# Patient Record
Sex: MALE | Race: WHITE | NOT HISPANIC OR LATINO | Employment: OTHER | URBAN - METROPOLITAN AREA
[De-identification: names, ages, dates, MRNs, and addresses within clinical notes are randomized per-mention and may not be internally consistent; named-entity substitution may affect disease eponyms.]

---

## 2017-01-24 ENCOUNTER — TELEPHONE (OUTPATIENT)
Dept: TRANSPLANT | Facility: CLINIC | Age: 47
End: 2017-01-24

## 2017-01-24 DIAGNOSIS — I50.9 CONGESTIVE HEART FAILURE, UNSPECIFIED CONGESTIVE HEART FAILURE CHRONICITY, UNSPECIFIED CONGESTIVE HEART FAILURE TYPE: Primary | ICD-10-CM

## 2017-01-24 NOTE — TELEPHONE ENCOUNTER
----- Message from Susan Griffith MA sent at 1/24/2017  1:53 PM CST -----  Contact: wife called      ----- Message -----     From: Marion Funes MA     Sent: 1/24/2017  12:42 PM       To: [facility] Heart Transplant Schedulers    Please call the patient wife at [phone] she need an apt with .Thank you.

## 2017-01-24 NOTE — TELEPHONE ENCOUNTER
Records received for OHT . OHT episode created once initial visit approved pt will be contacted and scheduled accordingly.

## 2017-01-24 NOTE — TELEPHONE ENCOUNTER
Spoke with patient wife, informed that the referral process was began today and should hear a call back on tomorrow no later than Thursday. Pt is open to see other physicians outside of Dr. Mckenzie as per  Requested.  Demographics updated.

## 2017-01-25 NOTE — TELEPHONE ENCOUNTER
"Patient approved for initial consult. Pt will be scheduled accordingly.     Labs & echo scheduled on the same day as consult.     Spoke with patient spouse while patient was on the phone as well regarding scheduled appointments. Given, appointments date, time,location and number to call if they need to cancel or rescheduled. All questions and concerns addressed. Understanding verbalized.     REFERRAL NOTE:    Urbano Hensley has been referred to the pre-heart transplant office for consideration for orthotopic heart transplantation by Mayito Dillard .  Patient's appointments have been scheduled for 2/3/2017.  Information was provided and questions were answered.  Copies of "What to Expect" and "Cardiomyopathy and Heart Transplant" were mailed to the patient.      "

## 2017-01-31 PROBLEM — R57.0 CARDIOGENIC SHOCK: Status: ACTIVE | Noted: 2017-01-31

## 2017-02-02 ENCOUNTER — HOSPITAL ENCOUNTER (INPATIENT)
Facility: HOSPITAL | Age: 47
LOS: 8 days | Discharge: HOME HEALTH | DRG: 270 | End: 2017-02-10
Attending: INTERNAL MEDICINE | Admitting: INTERNAL MEDICINE
Payer: MEDICARE

## 2017-02-02 DIAGNOSIS — I25.10 CORONARY ARTERY DISEASE INVOLVING NATIVE CORONARY ARTERY OF NATIVE HEART WITHOUT ANGINA PECTORIS: ICD-10-CM

## 2017-02-02 DIAGNOSIS — I42.0 COCM (CONGESTIVE CARDIOMYOPATHY): ICD-10-CM

## 2017-02-02 DIAGNOSIS — R57.0 CARDIOGENIC SHOCK: ICD-10-CM

## 2017-02-02 DIAGNOSIS — F20.0 PARANOID SCHIZOPHRENIA: ICD-10-CM

## 2017-02-02 DIAGNOSIS — I50.23 ACUTE ON CHRONIC SYSTOLIC CHF (CONGESTIVE HEART FAILURE), NYHA CLASS 4: ICD-10-CM

## 2017-02-02 DIAGNOSIS — I25.5 ISCHEMIC CARDIOMYOPATHY: ICD-10-CM

## 2017-02-02 LAB
ALBUMIN SERPL BCP-MCNC: 3.2 G/DL
ALP SERPL-CCNC: 181 U/L
ALT SERPL W/O P-5'-P-CCNC: 1061 U/L
ANION GAP SERPL CALC-SCNC: 11 MMOL/L
AST SERPL-CCNC: 929 U/L
BILIRUB SERPL-MCNC: 2.4 MG/DL
BNP SERPL-MCNC: 2917 PG/ML
BUN SERPL-MCNC: 54 MG/DL
CALCIUM SERPL-MCNC: 8.8 MG/DL
CHLORIDE SERPL-SCNC: 93 MMOL/L
CO2 SERPL-SCNC: 21 MMOL/L
CREAT SERPL-MCNC: 1.4 MG/DL
EST. GFR  (AFRICAN AMERICAN): >60 ML/MIN/1.73 M^2
EST. GFR  (NON AFRICAN AMERICAN): 59.4 ML/MIN/1.73 M^2
GLUCOSE SERPL-MCNC: 96 MG/DL
INR PPP: 2.3
MAGNESIUM SERPL-MCNC: 2.2 MG/DL
PHOSPHATE SERPL-MCNC: 3.8 MG/DL
POTASSIUM SERPL-SCNC: 5.9 MMOL/L
PROT SERPL-MCNC: 6.1 G/DL
PROTHROMBIN TIME: 22.6 SEC
SODIUM SERPL-SCNC: 125 MMOL/L

## 2017-02-02 PROCEDURE — 63600175 PHARM REV CODE 636 W HCPCS: Performed by: INTERNAL MEDICINE

## 2017-02-02 PROCEDURE — 80053 COMPREHEN METABOLIC PANEL: CPT

## 2017-02-02 PROCEDURE — 93010 ELECTROCARDIOGRAM REPORT: CPT | Mod: ,,, | Performed by: INTERNAL MEDICINE

## 2017-02-02 PROCEDURE — 51702 INSERT TEMP BLADDER CATH: CPT

## 2017-02-02 PROCEDURE — 84100 ASSAY OF PHOSPHORUS: CPT

## 2017-02-02 PROCEDURE — 5A02210 ASSISTANCE WITH CARDIAC OUTPUT USING BALLOON PUMP, CONTINUOUS: ICD-10-PCS | Performed by: INTERNAL MEDICINE

## 2017-02-02 PROCEDURE — 83880 ASSAY OF NATRIURETIC PEPTIDE: CPT

## 2017-02-02 PROCEDURE — C1751 CATH, INF, PER/CENT/MIDLINE: HCPCS

## 2017-02-02 PROCEDURE — 27100094 HC IABP, SET-UP

## 2017-02-02 PROCEDURE — 20000000 HC ICU ROOM

## 2017-02-02 PROCEDURE — 83735 ASSAY OF MAGNESIUM: CPT

## 2017-02-02 PROCEDURE — 85610 PROTHROMBIN TIME: CPT

## 2017-02-02 PROCEDURE — 33967 INSERT I-AORT PERCUT DEVICE: CPT

## 2017-02-02 PROCEDURE — 27200188 HC TRANSDUCER, ART ADULT/PEDS

## 2017-02-02 PROCEDURE — 93005 ELECTROCARDIOGRAM TRACING: CPT

## 2017-02-02 PROCEDURE — 36556 INSERT NON-TUNNEL CV CATH: CPT

## 2017-02-02 RX ORDER — DOBUTAMINE HYDROCHLORIDE 400 MG/100ML
5 INJECTION, SOLUTION INTRAVENOUS CONTINUOUS
Status: DISCONTINUED | OUTPATIENT
Start: 2017-02-02 | End: 2017-02-10 | Stop reason: HOSPADM

## 2017-02-02 RX ORDER — FUROSEMIDE 10 MG/ML
80 INJECTION INTRAMUSCULAR; INTRAVENOUS ONCE
Status: COMPLETED | OUTPATIENT
Start: 2017-02-02 | End: 2017-02-02

## 2017-02-02 RX ORDER — LIDOCAINE HYDROCHLORIDE 20 MG/ML
INJECTION, SOLUTION INFILTRATION; PERINEURAL
Status: DISPENSED
Start: 2017-02-02 | End: 2017-02-03

## 2017-02-02 RX ADMIN — FUROSEMIDE 80 MG: 10 INJECTION, SOLUTION INTRAMUSCULAR; INTRAVENOUS at 10:02

## 2017-02-02 RX ADMIN — FUROSEMIDE 15 MG/HR: 10 INJECTION, SOLUTION INTRAMUSCULAR; INTRAVENOUS at 11:02

## 2017-02-02 NOTE — IP AVS SNAPSHOT
[facility]  [address]  [facility] [address]  Phone: [phone]           Patient Discharge Instructions     Our goal is to set you up for success. This packet includes information on your condition, medications, and your home care. It will help you to care for yourself so you don't get sicker and need to go back to the hospital.     Please ask your nurse if you have any questions.        There are many details to remember when preparing to leave the hospital. Here is what you will need to do:    1. Take your medicine. If you are prescribed medications, review your Medication List in the following pages. You may have new medications to  at the pharmacy and others that you'll need to stop taking. Review the instructions for how and when to take your medications. Talk with your doctor or nurses if you are unsure of what to do.     2. Go to your follow-up appointments. Specific follow-up information is listed in the following pages. Your may be contacted by a transition nurse or clinical provider about future appointments. Be sure we have all of the phone numbers to reach you, if needed. Please contact your provider's office if you are unable to make an appointment.     3. Watch for warning signs. Your doctor or nurse will give you detailed warning signs to watch for and when to call for assistance. These instructions may also include educational information about your condition. If you experience any of warning signs to your health, call your doctor.               [facility]  Unless otherwise directed by your provider, please contact [facility], our nurse care line that is available for 24/7 assistance.     [phone] (toll-free)    Registered nurses in the [facility] provide clinical advisement, health education, appointment booking, and other advisory services.                    ** Verify the list of medication(s) below is accurate and up  to date. Carry this with you in case of emergency. If your medications have changed, please notify your healthcare provider.             Medication List      START taking these medications        Additional Info                      aspirin 81 MG EC tablet   Commonly known as:  ECOTRIN   Refills:  0   Dose:  81 mg    Last time this was given:  81 mg on 2/10/2017  8:45 AM   Instructions:  Take 1 tablet (81 mg total) by mouth once daily.     Begin Date    AM    Noon    PM    Bedtime       cyclobenzaprine 5 MG tablet   Commonly known as:  FLEXERIL   Quantity:  30 tablet   Refills:  3   Dose:  5 mg    Last time this was given:  5 mg on 2/9/2017  2:20 PM   Instructions:  Take 1 tablet (5 mg total) by mouth 3 (three) times daily as needed for Muscle spasms.     Begin Date    AM    Noon    PM    Bedtime       DOBUTamine 500 mg/250 mL (2,000 mcg/mL)   Commonly known as:  DOBUTREX   Quantity:  250 mL   Refills:  0   Dose:  5 mcg/kg/min    Last time this was given:  5 mcg/kg/min on 2/10/2017  4:00 AM   Instructions:  Inject 359.5 mcg/min into the vein continuous.     Begin Date    AM    Noon    PM    Bedtime       furosemide 80 MG tablet   Commonly known as:  LASIX   Quantity:  90 tablet   Refills:  11   Dose:  80 mg    Last time this was given:  80 mg on 2/10/2017  8:45 AM   Instructions:  Take 1 tablet (80 mg total) by mouth once daily.     Begin Date    AM    Noon    PM    Bedtime       lisinopril 2.5 MG tablet   Commonly known as:  PRINIVIL,ZESTRIL   Quantity:  180 tablet   Refills:  3   Dose:  2.5 mg    Last time this was given:  2.5 mg on 2/10/2017  8:45 AM   Instructions:  Take 1 tablet (2.5 mg total) by mouth 2 (two) times daily.     Begin Date    AM    Noon    PM    Bedtime       oxycodone 5 MG immediate release tablet   Commonly known as:  ROXICODONE   Quantity:  20 tablet   Refills:  0   Dose:  5 mg    Last time this was given:  5 mg on 2/10/2017  1:01 PM   Instructions:  Take 1 tablet (5 mg total) by mouth every 6  (six) hours as needed.     Begin Date    AM    Noon    PM    Bedtime       spironolactone 25 MG tablet   Commonly known as:  ALDACTONE   Quantity:  90 tablet   Refills:  11   Dose:  25 mg    Last time this was given:  25 mg on 2/10/2017  8:45 AM   Instructions:  Take 1 tablet (25 mg total) by mouth once daily.     Begin Date    AM    Noon    PM    Bedtime         CHANGE how you take these medications        Additional Info                      mirtazapine 45 MG tablet   Commonly known as:  REMERON   Refills:  0   Dose:  45 mg   What changed:  Another medication with the same name was removed. Continue taking this medication, and follow the directions you see here.    Last time this was given:  45 mg on 2/6/2017  9:04 PM   Instructions:  Take 45 mg by mouth every evening.     Begin Date    AM    Noon    PM    Bedtime         CONTINUE taking these medications        Additional Info                      clonazePAM 0.5 MG tablet   Commonly known as:  KLONOPIN   Refills:  0   Dose:  0.5 mg    Last time this was given:  0.5 mg on 2/9/2017  9:58 PM   Instructions:  Take 0.5 mg by mouth 2 (two) times daily.     Begin Date    AM    Noon    PM    Bedtime       clopidogrel 75 mg tablet   Commonly known as:  PLAVIX   Refills:  0   Dose:  75 mg    Last time this was given:  75 mg on 2/10/2017  8:46 AM   Instructions:  Take 75 mg by mouth once daily.     Begin Date    AM    Noon    PM    Bedtime       gabapentin 800 MG tablet   Commonly known as:  NEURONTIN   Refills:  0   Dose:  800 mg    Instructions:  Take 800 mg by mouth 2 (two) times daily.     Begin Date    AM    Noon    PM    Bedtime       hydrocodone-acetaminophen 5-325mg 5-325 mg per tablet   Commonly known as:  NORCO   Quantity:  24 tablet   Refills:  0   Dose:  2 tablet    Instructions:  Take 2 tablets by mouth every 6 (six) hours as needed for Pain (breakthrough pain not controlled by other analgesics.  May cause sedation.). Take 1-2 tablets     Begin Date    AM     Noon    PM    Bedtime       pramoxine-hydrocortisone 1-1 % rectal cream   Commonly known as:  PROCTOCREAM-HC   Quantity:  1 Tube   Refills:  0    Instructions:  Place rectally 3 (three) times daily.     Begin Date    AM    Noon    PM    Bedtime         STOP taking these medications     ibuprofen 600 MG tablet   Commonly known as:  ADVILMOTRIN            Where to Get Your Medications      These medications were sent to [id number] [place] - [address] [facility] [address] [facility] [address] [place] [address]    Hours:  24-hours Phone:  [phone]     cyclobenzaprine 5 MG tablet    furosemide 80 MG tablet    lisinopril 2.5 MG tablet    spironolactone 25 MG tablet         You can get these medications from any pharmacy     Bring a paper prescription for each of these medications     oxycodone 5 MG immediate release tablet       You don't need a prescription for these medications     aspirin 81 MG EC tablet         Information about where to get these medications is not yet available     ! Ask your nurse or doctor about these medications     DOBUTamine 500 mg/250 mL (2,000 mcg/mL)                  Please bring to all follow up appointments:    1. A copy of your discharge instructions.  2. All medicines you are currently taking in their original bottles.  3. Identification and insurance card.    Please arrive 15 minutes ahead of scheduled appointment time.    Please call 24 hours in advance if you must reschedule your appointment and/or time.        Follow-up Information     Follow up with Lowell Hurst MD In 2 weeks.          Primary Diagnosis     Your primary diagnosis was:  Heart Failure That Is Caused By Inadequate Blood Supply      Admission Information     Date & Time Provider Department [facility]    2/2/2017  9:45 PM Ghulam Keita MD [facility] [id number]      Care Providers     Provider Role Specialty Primary office phone    Ghulam Keita MD Attending  "Provider Cardiology [phone]    Ghulam Keita MD Consulting Physician  Cardiology [phone]    Jaci Nieto MD Team Attending  Cardiology [phone]      Your Vitals Were     BP Pulse Temp Resp Height Weight    95/56 (BP Location: Right arm, Patient Position: Sitting, BP Method: Automatic) 88 98.3 °F (36.8 °C) (Oral) 18 5' 11" (1.803 m) 65.9 kg (145 lb 4.5 oz)    SpO2 BMI             99% 20.26 kg/m2         Recent Lab Values     No lab values to display.      Allergies as of 2/10/2017     No Known Allergies      Advance Directives     An advance directive is a document which, in the event you are no longer able to make decisions for yourself, tells your healthcare team what kind of treatment you do or do not want to receive, or who you would like to make those decisions for you.  If you do not currently have an advance directive, [facility] encourages you to create one.  For more information call:  [phone]), [phone] ([phone]),  or log on to [email or web address].        Smoking Cessation     If you would like to quit smoking:   You may be eligible for free services if you are a [place] resident and started smoking cigarettes before September 1, 1988.  Call the Smoking Cessation Trust (SCT) toll free at (337) 926-3205 or (120) 627-5202.   Call 1-800-QUIT-NOW if you do not meet the above criteria.            Language Assistance Services     ATTENTION: Language assistance services are available, free of charge. Please call [phone].      ATENCIÓN: Si habla español, tiene a zabala disposición servicios gratuitos de asistencia lingüística. Llame al [phone].     CHÚ Ý: N?u b?n nói Ti?ng Vi?t, có các d?ch v? h? tr? ngôn ng? mi?n phí dành cho b?n. G?i s? [phone].        Heart Failure Education       Heart Failure: Being Active  You have a condition called heart failure. Being active doesnt mean that you have to wear yourself out. Even a little movement each day " helps to strengthen your heart. If you cant get out to exercise, you can do simple stretching and strengthening exercises at home. These are good ways to keep you well-conditioned and prevent you and your heart from becoming excessively weak.    Ideas to get you started  · Add a little movement to things you do now. Walk to mail letters. Park your car at the far end of the parking lot and walk to the store. Walk up a flight of stairs instead of taking the elevator.  · Choose activities you enjoy. You might walk, swim, or ride an exercise bike. Things like gardening and washing the car count, too. Other possibilities include: washing dishes, walking the dog, walking around the mall, and doing aerobic activities with friends.  · Join a group exercise program at a [facility] or [facility], a senior center, or a community center. Or look into a hospital cardiac rehabilitation program. Ask your doctor if you qualify.  Tips to keep you going  · Get up and get dressed each day. Go to a coffee shop and read a newspaper or go somewhere that you'll be in the presence of other active people. Youll feel more like being active.  · Make a plan. Choose one or more activities that you enjoy and that you can easily do. Then plan to do at least one each day. You might write your plan on a calendar.  · Go with a friend or a group if you like company. This can help you feel supported and stay motivated, too.  · Plan social events that you enjoy. This will keep you mentally engaged as well as physically motivated to do things you find pleasure in.  For your safety  · Talk with your healthcare provider before starting an exercise program.  · Exercise indoors when its too hot or too cold outside, or when the air quality is poor. Try walking at a shopping mall.  · Wear socks and sturdy shoes to maintain your balance and prevent falls.  · Start slowly. Do a few minutes several times a day at first. Increase your time and speed little by  little.  · Stop and rest whenever you feel tired or get short of breath.  · Dont push yourself on days when you dont feel well.  Date Last Reviewed: 3/20/2016  © 6849-6360 [employer]. [address] [place] [address], [place] [address]. All rights reserved. This information is not intended as a substitute for professional medical care. Always follow your healthcare professional's instructions.              Heart Failure: Evaluating Your Heart  You have a condition called heart failure. To evaluate your condition, your doctor will examine you, ask questions, and do some tests. Along with looking for signs of heart failure, the doctor looks for any other health problems that may have led to heart failure. The results of your evaluation will help your doctor form a treatment plan.  Health history and physical exam  Your visit will start with a health history. Tell the doctor about any symptoms youve noticed and about all medicines you take. Then youll have a physical exam. This includes listening to your heartbeat and breathing. Youll also be checked for swelling (edema) in your legs and neck. When you have fluid buildup or fluid in the lungs, it may be called congestive heart failure.  Diagnosing heart failure     During an echocardiogram, sound waves bounce off the heart. These are converted into a picture on the screen.   The following may be done to help your doctor form a diagnosis:  · X-rays show the size and shape of your heart. These pictures can also show fluid in your lungs.  · An electrocardiogram (ECG or EKG) shows the pattern of your heartbeat. Small pads (electrodes) are placed on your chest, arms, and legs. Wires connect the pads to the ECG machine, which records your hearts electrical signals. This can give the doctor information about heart function.  · An echocardiogram uses ultrasound waves to show the structure and movement of your heart muscle. This shows how well the heart pumps. It  also shows the thickness of the heart walls, and if the heart is enlarged. It is one of the most useful, non-invasive tests as it provides information about the heart's general function. This helps your doctor make treatment decisions.  · Lab tests evaluate small amounts of blood or urine for signs of problems. A BNP lab test can help diagnose and evaluate heart failure. BNP stands for B-type natriuretic peptide. The ventricles secrete more BNP when heart failure worsens. Lab tests can also provide information about metabolic dysfunction or heart dysfunction.  Your treatment plan  Based on the results of your evaluation and tests, your doctor will develop a treatment plan. This plan is designed to relieve some of your heart failure symptoms and help make you more comfortable. Your treatment plan may include:  · Medicine to help your heart work better and improve your quality of life  · Changes in what you eat and drink to help prevent fluid from backing up in your body  · Daily monitoring of your weight and heart failure symptoms to see how well your treatment plan is working  · Exercise to help you stay healthy  · Help with quitting smoking  · Emotional and psychological support to help adjust to the changes  · Referrals to other specialists to make sure you are being treated comprehensively  Date Last Reviewed: 3/21/2016  © 4166-5639 [employer]. [address] [place] [address], [place] [address]. All rights reserved. This information is not intended as a substitute for professional medical care. Always follow your healthcare professional's instructions.              Heart Failure: Making Changes to Your Diet  You have a condition called heart failure. When you have heart failure, excess fluid is more likely to build up in your body because your heart isn't working well. This makes the heart work harder to pump blood. Fluid buildup causes symptoms such as shortness of breath and swelling (edema). This is  often referred to as congestive heart failure or CHF. Controlling the amount of salt (sodium) you eat may help stop fluid from building up. Your doctor may also tell you to reduce the amount of fluid you drink.  Reading food labels    Your healthcare provider will tell you how much sodium you can eat each day. Read food labels to keep track. Keep in mind that certain foods are high in salt. These include canned, frozen, and processed foods. Check the amount of sodium in each serving. Watch out for high-sodium ingredients. These include MSG (monosodium glutamate), baking soda, and sodium phosphate.   Eating less salt  Give yourself time to get used to eating less salt. It may take a little while. Here are some tips to help:  · Take the saltshaker off the table. Replace it with salt-free herb mixes and spices.  · Eat fresh or plain frozen vegetables. These have much less salt than canned vegetables.  · Choose low-sodium snacks like sodium-free pretzels, crackers, or air-popped popcorn.  · Dont add salt to your food when youre cooking. Instead, season your foods with pepper, lemon, garlic, or onion.  · When you eat out, ask that your food be cooked without added salt.  · Avoid eating fried foods as these often have a great deal of salt.  If youre told to limit fluids  You may need to limit how much fluid you have to help prevent swelling. This includes anything that is liquid at room temperature, such as ice cream and soup. If your doctor tells you to limit fluid, try these tips:  · Measure drinks in a measuring cup before you drink them. This will help you meet daily goals.  · Chill drinks to make them more refreshing.  · Suck on frozen lemon wedges to quench thirst.  · Only drink when youre thirsty.  · Chew sugarless gum or suck on hard candy to keep your mouth moist.  · Weigh yourself daily to know if your body's fluid content is rising.  My sodium goal  Your healthcare provider may give you a sodium goal to  meet each day. This includes sodium found in food as well as salt that you add. My goal is to eat no more than ___________ mg of sodium per day.     When to call your doctor  Call your doctor right away if you have any symptoms of worsening heart failure. These can include:  · Sudden weight gain  · Increased swelling of your legs or ankles  · Trouble breathing when youre resting or at night  · Increase in the number of pillows you have to sleep on  · Chest pain, pressure, discomfort, or pain in the jaw, neck, or back   Date Last Reviewed: 3/21/2016  © 0813-5504 [employer]. [address] [place] [address], [place] [address]. All rights reserved. This information is not intended as a substitute for professional medical care. Always follow your healthcare professional's instructions.              Heart Failure: Medicines to Help Your Heart    You have a condition called heart failure (also known as congestive heart failure, or CHF). Your doctor will likely prescribe medicines for heart failure and any underlying health problems you have. Most heart failure patients take one or more types of medicinen. Your healthcare provider will work to find the combination of medicines that works best for you.  Heart failure medicines  Here are the most common heart failure medicines:  · ACE inhibitors lower blood pressure and decrease strain on the heart. This makes it easier for the heart to pump. Angiotensin receptor blockers have similar effects. These are prescribed for some patients instead of ACE inhibitors.  · Beta-blockers relieve stress on the heart. They also improve symptoms. They may also improve the heart's pumping action over time.  · Diuretics (also called water pills) help rid your body of excess water. This can help rid your body of swelling (edema). Having less fluid to pump means your heart doesnt have to work as hard. Some diuretics make your body lose a mineral called potassium. Your doctor will tell  you if you need to take supplements or eat more foods high in potassium.  · Digoxin helps your heart pump with more strength. This helps your heart pump more blood with each beat. So, more oxygen-rich blood travels to the rest of the body.  · Aldosterone antagonists help alter hormones and decrease strain on the heart.  · Hydralazine and nitrates are two separate medicines used together to treat heart failure. They may come in one combination pill. They lower blood pressure and decrease how hard the heart has to pump.  Medicines for related conditions  Controlling other heart problems helps keep heart failure under control, too. Depending on other heart problems you have, medicines may be prescribed to:  · Lower blood pressure (antihypertensives).  · Lower cholesterol levels (statins).  · Prevent blood clots (anticoagulants or aspirin).  · Keep the heartbeat steady (antiarrhythmics).  Date Last Reviewed: 3/5/2016  © 8311-9884 [employer]. [address] [place] [address]. All rights reserved. This information is not intended as a substitute for professional medical care. Always follow your healthcare professional's instructions.              Heart Failure: Procedures That May Help    The heart is a muscle that pumps oxygen-rich blood to all parts of the body. When you have heart failure, the heart is not able to pump as well as it should. Blood and fluid may back up into the lungs (congestive heart failure), and some parts of the body dont get enough oxygen-rich blood to work normally. These problems lead to the symptoms of heart failure.     Certain procedures may help the heart pump better in some cases of heart failure. Some procedures are done to treat health problems that may have caused the heart failure such as coronary artery disease or heart rhythm problems. For more serious heart failure, other options are available.  Treating artery and valve problems  If you have coronary artery  disease or valve disease, procedures may be done to improve blood flow. This helps the heart pump better, which can improve heart failure symptoms. First, your doctor may do a cardiac catheterization to help detect clogged blood vessels or valve damage. During this procedure, a  thin tube (catheter) in inserted into a blood vessel and guided to the heart. There a dye is injected and a special type of X-ray (angiogram) is taken of the blood vessels. Procedures to open a blocked artery or fix damaged valves can also be done using catheterization.  · Angioplasty uses a balloon-tipped instrument at the end of the catheter. The balloon is inflated to widen the narrowed artery. In many cases, a stent is expanded to further support the narrowed artery. A stent is a metal mesh tube.  · Valve surgery repairs or replacement of faulty valves can also be done during catheterization so blood can flow properly through the chambers of the heart.  Bypass surgery is another option to help treat blocked arteries. It uses a healthy blood vessel from elsewhere in the body. The healthy blood vessel is attached above and below the blocked area so that blood can flow around the blocked artery.  Treating heart rhythm problems  A device may be placed in the chest to help a weak heart maintain a healthy, heartbeat so the heart can pump more effectively:  · Pacemaker. A pacemaker is an implanted device that regulates your heartbeat electronically. It monitors your heart's rhythm and generates a painless electric impulse that helps the heart beat in a regular rhythm. A pacemaker is programmed to meet your specific heart rhythm needs.  · Biventricular pacing/cardiac resynchronization therapy. A type of pacemaker that paces both pumping chambers of the heart at the same time to coordinate contractions and to improve the heart's function. Some people with heart failure are candidates for this therapy.  · Implantable cardioverter defibrillator. A  device similar to a pacemaker that senses when the heart is beating too fast and delivers an electrical shock to convert the fast rhythm to a normal rhythm. This can be a life saving device.  In severe cases  In more serious cases of heart failure when other treatments no longer work, other options may include:  · Ventricular assist devices (VADs). These are mechanical devices used to take over the pumping function for one or both of the heart's ventricles, or pumping chambers. A VAD may be necessary when heart failure progresses to the point that medicines and other treatments no longer help. In some cases, a VAD may be used as a bridge to transplant.  · Heart transplant. This is replacing the diseased heart with a healthy one from a donor. This is an option for a few people who are very sick. A heart transplant is very serious and not an option for all patients. Your doctor can tell you more.  Date Last Reviewed: 3/20/2016  © 1442-7271 [employer]. [address] [place] [address]. All rights reserved. This information is not intended as a substitute for professional medical care. Always follow your healthcare professional's instructions.              Heart Failure: Tracking Your Weight  You have a condition called heart failure. When you have heart failure, a sudden weight gain or a steady rise in weight is a warning sign that your body is retaining too much water and salt. This could mean your heart failure is getting worse. If left untreated, it can cause problems for your lungs and result in shortness of breath. Weighing yourself each day is the best way to know if youre retaining water. If your weight goes up quickly, call your doctor. You will be given instructions on how to get rid of the excess water. You will likely need medicines and to avoid salt. This will help your heart work better.  Call your doctor if you gain more than 2 pounds in 1 day, more than 5 pounds in 1 week, or  whatever weight gain you were told to report by your doctor. This is often a sign of worsening heart failure and needs to be evaluated and treated. Your doctor will tell you what to do next.   Tips for weighing yourself    · Weigh yourself at the same time each morning, wearing the same clothes. Weigh yourself after urinating and before eating.  · Use the same scale each day. Make sure the numbers are easy to read. Put the scale on a flat, hard surface -- not on a rug or carpet.  · Do not stop weighing yourself. If you forget one day, weigh again the next morning.  How to use your weight chart  · Keep your weight chart near the scale. Write your weight on the chart as soon as you get off the scale.  · Fill in the month and the start date on the chart. Then write down your weight each day. Your chart will look like this:    · If you miss a day, leave the space blank. Weigh yourself the next day and write your weight in the next space.  · Take your weight chart with you when you go to see your doctor.  Date Last Reviewed: 3/20/2016  © 9171-1881 [employer]. [address] [place] [address], [place] [address]. All rights reserved. This information is not intended as a substitute for professional medical care. Always follow your healthcare professional's instructions.              Heart Failure: Warning Signs of a Flare-Up  You have a condition called heart failure. Once you have heart failure, flare-ups can happen. Below are signs that can mean your heart failure is getting worse. If you notice any of these warning signs, call your healthcare provider.  Swelling    · Your feet, ankles, or lower legs get puffier.  · You notice skin changes on your lower legs.  · Your shoes feel too tight.  · Your clothes are tighter in the waist.  · You have trouble getting rings on or off your fingers.  Shortness of breath  · You have to breathe harder even when youre doing your normal activities or when youre resting.  · You are  short of breath walking up stairs or even short distances.  · You wake up at night short of breath or coughing.  · You need to use more pillows or sit up to sleep.  · You wake up tired or restless.  Other warning signs  · You feel weaker, dizzy, or more tired.  · You have chest pain or changes in your heartbeat.  · You have a cough that wont go away.  · You cant remember things or dont feel like eating.  Tracking your weight  Gaining weight is often the first warning sign that heart failure is getting worse. Gaining even a few pounds can be a sign that your body is retaining excess water and salt. Weighing yourself each day in the morning after you urinate and before you eat, is the best way to know if you're retaining water. Get a scale that is easy to read and make sure you wear the same clothes and use the same scale every time you weigh. Your healthcare provider will show you how to track your weight. Call your doctor if you gain more than 2 pounds in 1 day, 5 pounds in 1 week, or whatever weight gain you were told to report by your doctor. This is often a sign of worsening heart failure and needs to be evaluated and treated before it compromises your breathing. Your doctor will tell you what to do next.    Date Last Reviewed: 3/15/2016  © 4940-8083 [employer]. [address] [place] [address]. All rights reserved. This information is not intended as a substitute for professional medical care. Always follow your healthcare professional's instructions.              [facility] Sign-Up     Activating your [facility] account is as easy as 1-2-3!     1) Visit [email or web address], select Sign Up Now, enter this activation code and your date of birth, then select Next.  [email or web address]  Expires: 3/27/2017 11:32 AM      2) Create a username and password to use when you visit [facility] in the future and select a security question in case you lose your password and select Next.    3) Enter your  e-mail address and click Sign Up!    Additional Information  If you have questions, please e-mail [email or web address] or call [phone] to talk to our [facility] staff. Remember, [facility] is NOT to be used for urgent needs. For medical emergencies, dial 911.          [facility] complies with applicable Federal civil rights laws and does not discriminate on the basis of race, color, national origin, age, disability, or sex.

## 2017-02-03 LAB
ALLENS TEST: ABNORMAL
ALLENS TEST: ABNORMAL
ANION GAP SERPL CALC-SCNC: 8 MMOL/L
ANION GAP SERPL CALC-SCNC: 8 MMOL/L
BASOPHILS # BLD AUTO: 0 K/UL
BASOPHILS NFR BLD: 0 %
BUN SERPL-MCNC: 42 MG/DL
BUN SERPL-MCNC: 50 MG/DL
CALCIUM SERPL-MCNC: 8 MG/DL
CALCIUM SERPL-MCNC: 8.6 MG/DL
CHLORIDE SERPL-SCNC: 91 MMOL/L
CHLORIDE SERPL-SCNC: 92 MMOL/L
CO2 SERPL-SCNC: 29 MMOL/L
CO2 SERPL-SCNC: 33 MMOL/L
CREAT SERPL-MCNC: 1.1 MG/DL
CREAT SERPL-MCNC: 1.2 MG/DL
DIFFERENTIAL METHOD: ABNORMAL
EOSINOPHIL # BLD AUTO: 0 K/UL
EOSINOPHIL NFR BLD: 0 %
ERYTHROCYTE [DISTWIDTH] IN BLOOD BY AUTOMATED COUNT: 15.1 %
EST. GFR  (AFRICAN AMERICAN): >60 ML/MIN/1.73 M^2
EST. GFR  (AFRICAN AMERICAN): >60 ML/MIN/1.73 M^2
EST. GFR  (NON AFRICAN AMERICAN): >60 ML/MIN/1.73 M^2
EST. GFR  (NON AFRICAN AMERICAN): >60 ML/MIN/1.73 M^2
GLUCOSE SERPL-MCNC: 110 MG/DL
GLUCOSE SERPL-MCNC: 94 MG/DL
HCO3 UR-SCNC: 25.7 MMOL/L (ref 24–28)
HCO3 UR-SCNC: 28.3 MMOL/L (ref 24–28)
HCT VFR BLD AUTO: 35.1 %
HGB BLD-MCNC: 11.7 G/DL
INR PPP: 2.1
LYMPHOCYTES # BLD AUTO: 0.9 K/UL
LYMPHOCYTES NFR BLD: 9.7 %
MAGNESIUM SERPL-MCNC: 1.8 MG/DL
MAGNESIUM SERPL-MCNC: 2.3 MG/DL
MCH RBC QN AUTO: 28 PG
MCHC RBC AUTO-ENTMCNC: 33.3 %
MCV RBC AUTO: 84 FL
MONOCYTES # BLD AUTO: 1 K/UL
MONOCYTES NFR BLD: 11.1 %
NEUTROPHILS # BLD AUTO: 7.4 K/UL
NEUTROPHILS NFR BLD: 78.9 %
PCO2 BLDA: 41.4 MMHG (ref 35–45)
PCO2 BLDA: 42.7 MMHG (ref 35–45)
PH SMN: 7.4 [PH] (ref 7.35–7.45)
PH SMN: 7.43 [PH] (ref 7.35–7.45)
PLATELET # BLD AUTO: 189 K/UL
PMV BLD AUTO: 11.7 FL
PO2 BLDA: 30 MMHG (ref 40–60)
PO2 BLDA: 35 MMHG (ref 40–60)
POC BE: 1 MMOL/L
POC BE: 4 MMOL/L
POC PCO2 TEMP: 41.4 MMHG
POC PH TEMP: 7.4
POC PO2 TEMP: 30 MMHG
POC SATURATED O2: 57 % (ref 95–100)
POC SATURATED O2: 69 % (ref 95–100)
POC TCO2: 27 MMOL/L (ref 24–29)
POC TCO2: 30 MMOL/L (ref 24–29)
POC TEMPERATURE: ABNORMAL
POTASSIUM SERPL-SCNC: 3.2 MMOL/L
POTASSIUM SERPL-SCNC: 3.9 MMOL/L
PROTHROMBIN TIME: 21.2 SEC
RBC # BLD AUTO: 4.18 M/UL
SAMPLE: ABNORMAL
SAMPLE: ABNORMAL
SITE: ABNORMAL
SITE: ABNORMAL
SODIUM SERPL-SCNC: 128 MMOL/L
SODIUM SERPL-SCNC: 133 MMOL/L
WBC # BLD AUTO: 9.39 K/UL

## 2017-02-03 PROCEDURE — 63600175 PHARM REV CODE 636 W HCPCS: Performed by: NURSE PRACTITIONER

## 2017-02-03 PROCEDURE — 83735 ASSAY OF MAGNESIUM: CPT

## 2017-02-03 PROCEDURE — 63600175 PHARM REV CODE 636 W HCPCS: Performed by: INTERNAL MEDICINE

## 2017-02-03 PROCEDURE — 27200234 HC IABP BALLOON

## 2017-02-03 PROCEDURE — 25500020 PHARM REV CODE 255: Performed by: STUDENT IN AN ORGANIZED HEALTH CARE EDUCATION/TRAINING PROGRAM

## 2017-02-03 PROCEDURE — 94761 N-INVAS EAR/PLS OXIMETRY MLT: CPT

## 2017-02-03 PROCEDURE — 20000000 HC ICU ROOM

## 2017-02-03 PROCEDURE — 82803 BLOOD GASES ANY COMBINATION: CPT

## 2017-02-03 PROCEDURE — 85610 PROTHROMBIN TIME: CPT

## 2017-02-03 PROCEDURE — 27000221 HC OXYGEN, UP TO 24 HOURS

## 2017-02-03 PROCEDURE — 99223 1ST HOSP IP/OBS HIGH 75: CPT | Mod: ,,, | Performed by: INTERNAL MEDICINE

## 2017-02-03 PROCEDURE — 83735 ASSAY OF MAGNESIUM: CPT | Mod: 91

## 2017-02-03 PROCEDURE — 27000202 HC IABP, ADD'L DAY

## 2017-02-03 PROCEDURE — 25000003 PHARM REV CODE 250: Performed by: INTERNAL MEDICINE

## 2017-02-03 PROCEDURE — 80048 BASIC METABOLIC PNL TOTAL CA: CPT

## 2017-02-03 PROCEDURE — 25000003 PHARM REV CODE 250: Performed by: NURSE PRACTITIONER

## 2017-02-03 PROCEDURE — 99900035 HC TECH TIME PER 15 MIN (STAT)

## 2017-02-03 PROCEDURE — 85025 COMPLETE CBC W/AUTO DIFF WBC: CPT

## 2017-02-03 PROCEDURE — 02HV33Z INSERTION OF INFUSION DEVICE INTO SUPERIOR VENA CAVA, PERCUTANEOUS APPROACH: ICD-10-PCS | Performed by: INTERNAL MEDICINE

## 2017-02-03 PROCEDURE — 80048 BASIC METABOLIC PNL TOTAL CA: CPT | Mod: 91

## 2017-02-03 RX ORDER — LISINOPRIL 2.5 MG/1
2.5 TABLET ORAL 2 TIMES DAILY
Status: DISCONTINUED | OUTPATIENT
Start: 2017-02-03 | End: 2017-02-10 | Stop reason: HOSPADM

## 2017-02-03 RX ORDER — POTASSIUM CHLORIDE 20 MEQ/1
40 TABLET, EXTENDED RELEASE ORAL ONCE
Status: COMPLETED | OUTPATIENT
Start: 2017-02-03 | End: 2017-02-03

## 2017-02-03 RX ORDER — LISINOPRIL 2.5 MG/1
2.5 TABLET ORAL 2 TIMES DAILY
Status: DISCONTINUED | OUTPATIENT
Start: 2017-02-03 | End: 2017-02-03

## 2017-02-03 RX ORDER — ASPIRIN 81 MG/1
81 TABLET ORAL DAILY
Status: DISCONTINUED | OUTPATIENT
Start: 2017-02-03 | End: 2017-02-10 | Stop reason: HOSPADM

## 2017-02-03 RX ORDER — MIRTAZAPINE 15 MG/1
45 TABLET, FILM COATED ORAL NIGHTLY
Status: DISCONTINUED | OUTPATIENT
Start: 2017-02-03 | End: 2017-02-10 | Stop reason: HOSPADM

## 2017-02-03 RX ORDER — POTASSIUM CHLORIDE 29.8 MG/ML
40 INJECTION INTRAVENOUS ONCE
Status: COMPLETED | OUTPATIENT
Start: 2017-02-03 | End: 2017-02-03

## 2017-02-03 RX ORDER — OXYCODONE HYDROCHLORIDE 5 MG/1
5 TABLET ORAL EVERY 4 HOURS PRN
Status: DISCONTINUED | OUTPATIENT
Start: 2017-02-03 | End: 2017-02-10 | Stop reason: HOSPADM

## 2017-02-03 RX ORDER — MORPHINE SULFATE 2 MG/ML
2 INJECTION, SOLUTION INTRAMUSCULAR; INTRAVENOUS EVERY 4 HOURS PRN
Status: DISCONTINUED | OUTPATIENT
Start: 2017-02-03 | End: 2017-02-10 | Stop reason: HOSPADM

## 2017-02-03 RX ORDER — HYDROMORPHONE HYDROCHLORIDE 1 MG/ML
0.5 INJECTION, SOLUTION INTRAMUSCULAR; INTRAVENOUS; SUBCUTANEOUS ONCE
Status: COMPLETED | OUTPATIENT
Start: 2017-02-03 | End: 2017-02-03

## 2017-02-03 RX ORDER — GABAPENTIN 400 MG/1
800 CAPSULE ORAL 2 TIMES DAILY
Status: DISCONTINUED | OUTPATIENT
Start: 2017-02-03 | End: 2017-02-10 | Stop reason: HOSPADM

## 2017-02-03 RX ORDER — SPIRONOLACTONE 25 MG/1
25 TABLET ORAL DAILY
Status: DISCONTINUED | OUTPATIENT
Start: 2017-02-03 | End: 2017-02-10 | Stop reason: HOSPADM

## 2017-02-03 RX ORDER — POTASSIUM CHLORIDE 20 MEQ/1
20 TABLET, EXTENDED RELEASE ORAL 2 TIMES DAILY
Status: DISCONTINUED | OUTPATIENT
Start: 2017-02-03 | End: 2017-02-04

## 2017-02-03 RX ORDER — CYCLOBENZAPRINE HCL 5 MG
5 TABLET ORAL 3 TIMES DAILY PRN
Status: DISCONTINUED | OUTPATIENT
Start: 2017-02-03 | End: 2017-02-10 | Stop reason: HOSPADM

## 2017-02-03 RX ORDER — CLOPIDOGREL BISULFATE 75 MG/1
75 TABLET ORAL DAILY
Status: DISCONTINUED | OUTPATIENT
Start: 2017-02-03 | End: 2017-02-10 | Stop reason: HOSPADM

## 2017-02-03 RX ORDER — CLONAZEPAM 0.5 MG/1
0.5 TABLET ORAL 2 TIMES DAILY
Status: DISCONTINUED | OUTPATIENT
Start: 2017-02-03 | End: 2017-02-10 | Stop reason: HOSPADM

## 2017-02-03 RX ORDER — MAGNESIUM SULFATE HEPTAHYDRATE 40 MG/ML
2 INJECTION, SOLUTION INTRAVENOUS ONCE
Status: COMPLETED | OUTPATIENT
Start: 2017-02-03 | End: 2017-02-03

## 2017-02-03 RX ADMIN — GABAPENTIN 800 MG: 400 CAPSULE ORAL at 09:02

## 2017-02-03 RX ADMIN — MORPHINE SULFATE 2 MG: 2 INJECTION, SOLUTION INTRAMUSCULAR; INTRAVENOUS at 07:02

## 2017-02-03 RX ADMIN — CLOPIDOGREL 75 MG: 75 TABLET, FILM COATED ORAL at 09:02

## 2017-02-03 RX ADMIN — POTASSIUM CHLORIDE 40 MEQ: 400 INJECTION, SOLUTION INTRAVENOUS at 03:02

## 2017-02-03 RX ADMIN — CLONAZEPAM 0.5 MG: 0.5 TABLET ORAL at 09:02

## 2017-02-03 RX ADMIN — OXYCODONE HYDROCHLORIDE 5 MG: 5 TABLET ORAL at 09:02

## 2017-02-03 RX ADMIN — LISINOPRIL 2.5 MG: 2.5 TABLET ORAL at 09:02

## 2017-02-03 RX ADMIN — OXYCODONE HYDROCHLORIDE 5 MG: 5 TABLET ORAL at 01:02

## 2017-02-03 RX ADMIN — DOBUTAMINE IN DEXTROSE 5 MCG/KG/MIN: 200 INJECTION, SOLUTION INTRAVENOUS at 06:02

## 2017-02-03 RX ADMIN — POTASSIUM CHLORIDE 20 MEQ: 1500 TABLET, EXTENDED RELEASE ORAL at 09:02

## 2017-02-03 RX ADMIN — POTASSIUM CHLORIDE 40 MEQ: 1500 TABLET, EXTENDED RELEASE ORAL at 04:02

## 2017-02-03 RX ADMIN — ASPIRIN 81 MG: 81 TABLET, COATED ORAL at 09:02

## 2017-02-03 RX ADMIN — SPIRONOLACTONE 25 MG: 25 TABLET, FILM COATED ORAL at 11:02

## 2017-02-03 RX ADMIN — CYCLOBENZAPRINE HYDROCHLORIDE 5 MG: 5 TABLET, FILM COATED ORAL at 09:02

## 2017-02-03 RX ADMIN — HYDROMORPHONE HYDROCHLORIDE 0.5 MG: 1 INJECTION, SOLUTION INTRAMUSCULAR; INTRAVENOUS; SUBCUTANEOUS at 04:02

## 2017-02-03 RX ADMIN — CYCLOBENZAPRINE HYDROCHLORIDE 5 MG: 5 TABLET, FILM COATED ORAL at 07:02

## 2017-02-03 RX ADMIN — SODIUM POLYSTYRENE SULFONATE 30 G: 15 SUSPENSION ORAL; RECTAL at 01:02

## 2017-02-03 RX ADMIN — FUROSEMIDE 15 MG/HR: 10 INJECTION, SOLUTION INTRAMUSCULAR; INTRAVENOUS at 02:02

## 2017-02-03 RX ADMIN — MORPHINE SULFATE 2 MG: 2 INJECTION, SOLUTION INTRAMUSCULAR; INTRAVENOUS at 11:02

## 2017-02-03 RX ADMIN — MAGNESIUM SULFATE IN WATER 2 G: 40 INJECTION, SOLUTION INTRAVENOUS at 03:02

## 2017-02-03 RX ADMIN — MIRTAZAPINE 45 MG: 15 TABLET, FILM COATED ORAL at 03:02

## 2017-02-03 RX ADMIN — MIRTAZAPINE 45 MG: 15 TABLET, FILM COATED ORAL at 09:02

## 2017-02-03 RX ADMIN — IOHEXOL 15 ML: 350 INJECTION, SOLUTION INTRAVENOUS at 05:02

## 2017-02-03 NOTE — PROCEDURES
Interventional Cardiology Procedure Note  Placed 40 cc IABP through a 7.5 Fr sheath in the RCFA at the bedside using sterile technique. Sutured in place and position confirmed with CXR. No immediate complications.     Please call Interventional if there is any change in vascular exam     Continue supportive care per HTS     Gerard Radford MD, MPH  PGY-IV  Cardiovascular Disease Fellow

## 2017-02-03 NOTE — H&P
Cardiology History & Physical  Attending Physician: Hasmukh Sewell Jr.,*  Chief Complaint: Cardiogenic shock     HPI:   46 y.o. gentleman with PMH of ICM (EF=15%), CAD (s/p PCI), HLD, HTN, depression and schizophrenia that presents from OSH for management of cardiogenic shock and work-up of advanced options.    Patient initially admitted to OSH on 1/29 with increasing abdominal swelling, SOB and nausea over a 3 day period. He also reports increased LE edema. Patient was supposed to see Dr. Keita on 2/3 in clinic for advanced options work-up. However, at OSH, he was found to be in cardiogenic shock with worsening ascites, renal dysfunction, elevated transaminases and worsening serum Na / K levels despite initiation of  and diuresis with Lasix. Plan was for patient to have IABP placed but was deferred due to elevated INR.    Patient denies chest pain, orthopnea, PND, palpitations, lightheadedness, dizziness, syncope, LE edema.    ROS:    Constitution: Negative for fever, chills, weight loss or gain.   HENT: Negative for sore throat, rhinorrhea, or headache.  Eyes: Negative for blurred or double vision.   Cardiovascular: See above  Pulmonary: Positive for SOB   Gastrointestinal: Negative for abdominal pain, nausea, vomiting, or diarrhea.   : Negative for dysuria.   Neurological: Negative for focal weakness or sensory changes.  PMH:     Past Medical History   Diagnosis Date    Bipolar 1 disorder     Coronary artery disease     Paranoid schizophrenia      Past Surgical History   Procedure Laterality Date    Cardiac surgery      Shoulder surgery       Allergies:   Review of patient's allergies indicates:  No Known Allergies  Medications:     No current facility-administered medications on file prior to encounter.      Current Outpatient Prescriptions on File Prior to Encounter   Medication Sig Dispense Refill    hydrocodone-acetaminophen 5-325mg (NORCO) 5-325 mg per tablet Take 2 tablets by mouth  every 6 (six) hours as needed for Pain (breakthrough pain not controlled by other analgesics.  May cause sedation.). Take 1-2 tablets 24 tablet 0    ibuprofen (ADVIL,MOTRIN) 600 MG tablet Take 1 tablet (600 mg total) by mouth every 6 (six) hours as needed for Pain (Take with food). 20 tablet 0    pramoxine-hydrocortisone (PROCTOCREAM-HC) 1-1 % rectal cream Place rectally 3 (three) times daily. 1 Tube 0       Inpatient Medications   Continuous Infusions:   DOBUTamine 5 mcg/kg/min (02/02/17 2300)    furosemide (LASIX) 1 mg/mL infusion (non-titrating) 15 mg/hr (02/02/17 2319)     Scheduled Meds:   lidocaine HCL 20 mg/ml (2%)         PRN Meds:     Social History:     Social History   Substance Use Topics    Smoking status: Current Every Day Smoker     Packs/day: 0.50     Years: 15.00     Types: Cigarettes    Smokeless tobacco: Not on file    Alcohol use No     Family History:     Family History   Problem Relation Age of Onset    No Known Problems Mother     Heart disease Father      Physical Exam:     Vitals:  Temp:  [98.4 °F (36.9 °C)]   Pulse:  [81-97]   Resp:  [28-31]   BP: (104-123)/(78-87)   SpO2:  [96 %-100 %]  on 2L NC I/O's:  No intake or output data in the 24 hours ending 02/02/17 2329     General: NAD  Neuro: Grossly intact  HEENT: Visual fields and EOM intact, ALVINA, no conjunctival injection, no pallor  Neck: No anterior or posterior lymphadenopathy. JVD ~ 12 cm. No carotid bruits.   Cardiac: S1, S2, 3/6 pansystolic murmur in apex. Pulses 2+, regular.   Pulmonary: Decreased BS in BL lung bases.   Abdominal: Soft, non-tender, distended, + fluid wave. No rebound or rigidity. Bowel sounds present.   Extremities: No clubbing, cyanosis. Trace edema.  Skin: No bruising or rash    Labs:       Recent Labs  Lab 02/02/17 2147   *   K 5.9*   CL 93*   CO2 21*   BUN 54*   CREATININE 1.4   ANIONGAP 11       Recent Labs  Lab 02/02/17 2147   *   ALT 1061*   ALKPHOS 181*   BILITOT 2.4*   ALBUMIN 3.2*        Recent Labs  Lab 02/02/17  2147   BNP 2917*    No results for input(s): WBC, HGB, HCT, PLT, GRAN in the last 168 hours.    Recent Labs  Lab 02/02/17  2147   INR 2.3*     No results found for: CHOL, HDL, LDLCALC, TRIG  No results found for: HGBA1C     Micro:  Blood Cultures  No results found for: LABBLOO  Urine Cultures  No results found for: LABURIN    Imaging:   CXR (2/2/17)  There is been interval placement of a right IJ CVL with projecting at the expected level of the distal SVC. IABP marker now noted at the level of the proximal descending thoracic aorta. Percutaneous cardiac device again noted on the left. There is slight blunting of the right costophrenic angle and obscuration of the right hemidiaphragm suggesting small pleural effusion and atelectatic changes. Cardiomediastinal silhouette is magnified by technique, but likely still enlarged. There is no significant consolidation. No pneumothorax.     No results found for: EF    EKG: NSR    Telemetry: NSR    Assessment:    46 y.o. gentleman with PMH of ICM (EF=15%), CAD (s/p PCI), HLD, HTN, depression and schizophrenia that presents from OSH for management of cardiogenic shock and work-up of advanced options. IABP and R IJ CVC placed upon arrival for hemodynamic support and invasive monitoring of CVP.     Plan:   Cardiogenic shock  - Patient presented with elevated transaminases and T.bili that has been steadily trending up over last 3 days, IABP placed at the bedside. Appreciate interventional cardiology assistance. Montior mixed venous O2. Diuresis with Lasix gtt, continue  at 5 mcg/kg/min  - 2D echo w/ CFD in AM  - Hold ACEI / Aldactone due to hyperkalemia  - Hold B-blocker with cardiogenic shock    CAD s/p PCI  - Continue statin, DAPT  - Obtain reports from OSH regarding previous PCIs and when last one was put in  - Continue high dose statin    Hx of Depression / Anxiety  - Continue home mirtazapine and klonopin    Hyperkalemia  - Kayexalate x  1  - Lasix gtt  - Recheck 2 hours after given    Cardiac diet. Strict I/Os. Daily weights.     Signed:  Gerard Radford MD, MPH  Cardiovascular Disease Fellow, PGY-4  Pager: [phone]  2/2/2017 11:29 PM

## 2017-02-03 NOTE — PROGRESS NOTES
Admit Note     Met with significant other to assess patients needs due to pt sleeping.  Patient is a 46 y.o.  male, admitted for heart failure.     Patient admitted from outside facility on 2/2/2017 .  At this time, patient presents as sleeping.  At this time, patients caregiver presents as alert and oriented x 4, pleasant, calm, communicative and asking and answering questions appropriately.      Household/Family Systems (as reported by patients caregiver)     Patient resides with patient's significant other (SO) Gregoria Webb, at     [address] [facility] [address].      Pt and SO share cell [phone]  Pt's phone is currently not working. SO reports it is a government phone and it has to be mailed in before they can receive a new one.     Gregoria can temporarily be reached at her mom Lori's cell [phone]     Support system includes SO, mom Janette, sister Nancy, and SO's family.  Patient does not have dependents that are need of being cared for.     Patients primary caregiver is SO Gregoria.     During admission, patient's caregiver plans to stay in patient's room.  Confirmed patient and patients caregivers do have access to reliable transportation.    Cognitive Status/Learning     Patients caregiver reports patients reading ability as 12th grade and states patient does not have difficulty with reading, writing, seeing, hearing, comprehension, learning and memory.   Needed: N/A.   Highest education level: High School (9-12) or GED. Pt is currently pursuing a business degree from an online university.    Vocation/Disability (as reported by patients caregiver)    Working for Income: No  If no, reason not working: Disability  Patient is disabled due to mental illness since 2014.  Prior to disability, patient  was employed as an .    Adherence     Patients caregiver reports patient has a high level of adherence to patients health care regimen.  Adherence counseling and education  "provided.  Patient's caregiver verbalizes understanding.    Substance Use    Patients caregiver reports patients substance usage as the following:    Tobacco: pt's SO reports pt quit smoking 4 days prior to admission. Pt smoked 1 ppd and started smoking at age 12..  Alcohol: pt's SO reports pt stopped drinking apx 15 yrs ago. SO states pt "was an alcoholic, but doesn't touch the stuff now".  Illicit Drugs/Non-prescribed Medications: none, patient denies any use.  Patients caregiver states clear understanding of the potential impact of substance use.  Substance abstinence/cessation counseling, education and resources provided and reviewed.     Services Utilizing/ADLS (as reported by patients caregiver)    Infusion Service: Prior to admission, patient utilizing? no  Home Health: Prior to admission, patient utilizing? no  DME: Prior to admission, no  Pulmonary/Cardiac Rehab: Prior to admission, no  Dialysis:  Prior to admission, no  Transplant Specialty Pharmacy:  Prior to admission, no.    Prior to admission, patients caregiver reports patient was independent with ADLS and was driving.  Patients caregiver reports patient is not able to care for self at this time due to compromised medical condition (as documented in medical record) and physical weakness..  Patients caregiver reports patient indicates a willingness to care for self once medically cleared to do so.    Insurance/Medications    Insured by   Payor/Plan Subscr  Sex Relation Sub. Ins. ID Effective Group Num   1. [place] - [place]* NATASHA BELL 1970 Male  [id number]                                    [address]   2. [place] NATASHA BELL 1970 Male  [id number]* 2/1/15                                    [address] [address]      Primary Insurance (for UNOS reporting): Public Insurance - Medicare FFS (Fee For Service)  Secondary Insurance (for UNOS reporting): Public Insurance - Medicaid    Patients caregiver reports patient is " "able to obtain and afford medications at this time and at time of discharge.    Living Will/Healthcare Power of     Patients caregiver reports patient does not have a LW and/or HCPA.   provided education regarding LW and HCPA and the completion of forms.    Coping/Mental Health (as reported by patients caregiver)    SW unable to assess pt's coping at this time. Pt's SO reports pt has a history of mental health issues, including diagnoses of bipolar disorder, paranoid schizophrenia, ADHD, and antisocial personality disorder. SO reports pt takes a number of medications, but she cannot remember names. Pt follows monthly with [place] psychiatrist Dr. Salazar and receives Invega shot every three months. Pt's SO states pt's symptoms seem to be well managed, but medications can make him "zombie like", so he cuts back on dosage. Pt's SO states HTS is aware and has consulted psychiatry.     Discharge Planning (as reported by patients caregiver)    At time of discharge, patient plans to return to patient's home under the care of Gregoria.  Patients significant other will transport patient.  Per rounds today, expected discharge date has not been medically determined at this time. Patients caretaker verbalizes understanding and is involved in treatment planning and discharge process.    Additional Concerns    Patient's caretaker denies additional needs and/or concerns at this time.  providing ongoing psychosocial support, education, resources and d/c planning as needed.  SW remains available. Patient's caregiver verbalizes understanding and agreement with information reviewed,  availability and how to access available resources as needed.  "

## 2017-02-03 NOTE — PROGRESS NOTES
"Hospital Day: 2    Subjective:  Interval History: has no complaints.    Scheduled Meds:   aspirin  81 mg Oral Daily    clonazePAM  0.5 mg Oral BID    clopidogrel  75 mg Oral Daily    gabapentin  800 mg Oral BID    lidocaine HCL 20 mg/ml (2%)        mirtazapine  45 mg Oral QHS     Continuous Infusions:   DOBUTamine 5 mcg/kg/min (02/03/17 0644)    furosemide (LASIX) 1 mg/mL infusion (non-titrating) 15 mg/hr (02/03/17 0600)     PRN Meds:    Objective:  Vital signs in last 24 hours:   Temp:  [98.4 °F (36.9 °C)-98.5 °F (36.9 °C)] 98.5 °F (36.9 °C)  Pulse:  [74-97] 74  Resp:  [11-31] 12  SpO2:  [96 %-100 %] 98 %  BP: (104-126)/(72-90) 124/72    Intake/Output last 3 shifts:  I/O last 3 completed shifts:  In: 175.9 [I.V.:175.9]  Out: 4260 [Urine:4260]  Intake/Output this shift:       Physical Exam:  Visit Vitals    /72    Pulse 74    Temp 98.5 °F (36.9 °C) (Axillary)    Resp 12    Ht 5' 11" (1.803 m)    Wt 71.9 kg (158 lb 8.2 oz)    SpO2 98%    BMI 22.11 kg/m2     General appearance: alert, appears stated age and cooperative  Neck: supple, symmetrical, trachea midline and Line in place  Lungs: diminished breath sounds bibasilar  Heart: regular rate and rhythm  Abdomen: soft, non-tender; bowel sounds normal; no masses,  no organomegaly  Extremities: extremities normal, atraumatic, no cyanosis or edema    Lab Review  Lab Results   Component Value Date     (L) 02/03/2017    K 3.9 02/03/2017    CL 91 (L) 02/03/2017    CO2 29 02/03/2017    BUN 50 (H) 02/03/2017    CREATININE 1.2 02/03/2017    CALCIUM 8.6 (L) 02/03/2017     Lab Results   Component Value Date    WBC 9.39 02/03/2017    HGB 11.7 (L) 02/03/2017    HCT 35.1 (L) 02/03/2017    MCV 84 02/03/2017     02/03/2017        Assessment/Plan:  46 y.o. gentleman with PMH of ICM (EF=15%), CAD (s/p PCI), HLD, HTN, depression and schizophrenia that presents from OSH for management of cardiogenic shock and work-up of advanced options. IABP and R IJ " CVC placed upon arrival for hemodynamic support and invasive monitoring of CVP.      Plan:   Cardiogenic shock  - Continue IABP 1:1.  - Continue  5mcg/kg/mn.  - Diuresing well. Continue Lasix gtt. 15mg/hr.   - 2D echo w/ CFD  - Will start low dose ACEI and uptitrate for afterload reduction.  - Hold B-blocker with cardiogenic shock  - Currently with no ICD  - Request financial clearance to start workup for advanced options.      CAD s/p PCI  - Continue statin, DAPT  - Obtain reports from OSH regarding previous PCIs and when last one was put in  - Continue high dose statin     Hx of Depression / Anxiety/Schizophrenia  - Continue home mirtazapine and klonopin  - Will order Invega once wife verifies dose

## 2017-02-03 NOTE — PROCEDURES
Bedside Central Venous Catheter Placement Procedure Note    Start time: 1130  Location: [facility]  Service: HTS  Procedure Provider: Gerard Radford    Staff Physician: Melodie    I have explained the risks, benefits, and alternatives of the procedure in detail. The patients voices understanding and all questions have been answered. The patient agrees to proceed as planned and signed the consent.     Time out performed simultaneously with nurse.     Indication: Cardiogenic shock    Prepped skin with Chlorhexidine. Full barrier precautions used throughout procedure.     Anesthestized with 5 mL of 1% Lidocaine without epi.   Central Venous Catheter Placement: [New, Ultrasound Guided]  Catheter Type: [Triple Lumen]  Number of Lumens: 3  Insertion Site: R IJ  Vessel accessed and catheter placed without complication   Catheter length: 16 cm Catheter secured at: 15 cm     Estimated blood loss: 10 cc.   Post Procedure Diagnosis: Cardiogenic shock  Complications: None - Confirmed placement on CXR.

## 2017-02-03 NOTE — PROGRESS NOTES
"At the request of Dr. Sewell, I have been asked to meet patient and provide VAD education. Introduced self and reason for visit. Pt asleep with emily at bedside.  Provided written VAD education (red folder) to emily. Included in folder is the following: VAD education, wellness contract, acknowledgement of being evaluated for VAD, support group flier, ICU visitation hours, VAD newsletter, Intermacs information, picture of VAD  In body, [employer] pamphlet, Living with HM II DVD, There is hope pamphlet, Tomorrow depends on the decision we make today patient education pack (HM II), [employer] information, and business cards for all VAD coordinators. Explained that we use 3 different types of pumps here and information on both pumps is in the red folder. I explained the work up process as well.     Explained to look over the entire contents and read the wellness contract, caregiver agreement and acknowledgement form.  Also explained they should bring this folder with them to all clinic visits and if they are admitted to the hospital so we can continue education as needed. Should there be any questions, please write them down and bring with you or feel free to call and we can talk on the phone. Per pt emily, pt has history of "forgetting things".  While discussing with pt emily she expressed pt "sometimes forgets to take his psych medications and then takes too much so I have to help him with it all".  Pt emily reports he take a 90 day injection of envega for paranoid schizofrenia, bipolar disored and ADHD.  SANDRITA Hernandez update on conversation with pt emily and need for neurocog testing to see patient ability to care for an LVAD.  All questions answered to emily satisfaction as evidence by verbal acknowledgement.   "

## 2017-02-03 NOTE — PLAN OF CARE
Problem: Patient Care Overview  Goal: Plan of Care Review  Outcome: Ongoing (interventions implemented as appropriate)  Pt transferred overnight from OHS. IABP placed urgently, CVC also placed. IABP w/o complications, augmenting well on 1:1. CVP decreasing well on lasix gtt, fantastic UoP. SOB and nausea much improved. Monitoring closely. Plan of care reviewed w/ pt and spouse and all questions answered.

## 2017-02-04 PROBLEM — I25.5 ISCHEMIC CARDIOMYOPATHY: Status: ACTIVE | Noted: 2017-02-04

## 2017-02-04 PROBLEM — I25.10 CORONARY ARTERY DISEASE INVOLVING NATIVE CORONARY ARTERY OF NATIVE HEART WITHOUT ANGINA PECTORIS: Status: ACTIVE | Noted: 2017-02-04

## 2017-02-04 PROBLEM — I50.23 ACUTE ON CHRONIC SYSTOLIC CHF (CONGESTIVE HEART FAILURE), NYHA CLASS 4: Status: ACTIVE | Noted: 2017-02-04

## 2017-02-04 PROBLEM — F20.0 PARANOID SCHIZOPHRENIA: Status: ACTIVE | Noted: 2017-02-04

## 2017-02-04 LAB
ALBUMIN SERPL BCP-MCNC: 2.8 G/DL
ALLENS TEST: ABNORMAL
ALP SERPL-CCNC: 165 U/L
ALT SERPL W/O P-5'-P-CCNC: 710 U/L
ANION GAP SERPL CALC-SCNC: 10 MMOL/L
AORTIC VALVE REGURGITATION: ABNORMAL
AST SERPL-CCNC: 495 U/L
BASOPHILS # BLD AUTO: 0 K/UL
BASOPHILS NFR BLD: 0 %
BILIRUB SERPL-MCNC: 2.7 MG/DL
BUN SERPL-MCNC: 36 MG/DL
CALCIUM SERPL-MCNC: 7.8 MG/DL
CHLORIDE SERPL-SCNC: 92 MMOL/L
CO2 SERPL-SCNC: 31 MMOL/L
CREAT SERPL-MCNC: 1 MG/DL
DELSYS: ABNORMAL
DIASTOLIC DYSFUNCTION: YES
DIFFERENTIAL METHOD: ABNORMAL
EOSINOPHIL # BLD AUTO: 0 K/UL
EOSINOPHIL NFR BLD: 0.1 %
ERYTHROCYTE [DISTWIDTH] IN BLOOD BY AUTOMATED COUNT: 15.1 %
EST. GFR  (AFRICAN AMERICAN): >60 ML/MIN/1.73 M^2
EST. GFR  (NON AFRICAN AMERICAN): >60 ML/MIN/1.73 M^2
ESTIMATED PA SYSTOLIC PRESSURE: 41
GLUCOSE SERPL-MCNC: 87 MG/DL
HCO3 UR-SCNC: 33.1 MMOL/L (ref 24–28)
HCT VFR BLD AUTO: 34.8 %
HGB BLD-MCNC: 11.4 G/DL
INR PPP: 1.8
LYMPHOCYTES # BLD AUTO: 1.3 K/UL
LYMPHOCYTES NFR BLD: 11.6 %
MAGNESIUM SERPL-MCNC: 2.4 MG/DL
MCH RBC QN AUTO: 27.8 PG
MCHC RBC AUTO-ENTMCNC: 32.8 %
MCV RBC AUTO: 85 FL
MONOCYTES # BLD AUTO: 1.2 K/UL
MONOCYTES NFR BLD: 11 %
NEUTROPHILS # BLD AUTO: 8.4 K/UL
NEUTROPHILS NFR BLD: 77.1 %
PCO2 BLDA: 48.1 MMHG (ref 35–45)
PH SMN: 7.45 [PH] (ref 7.35–7.45)
PLATELET # BLD AUTO: 185 K/UL
PMV BLD AUTO: 11 FL
PO2 BLDA: 33 MMHG (ref 40–60)
POC BE: 9 MMOL/L
POC SATURATED O2: 65 % (ref 95–100)
POC TCO2: 35 MMOL/L (ref 24–29)
POTASSIUM SERPL-SCNC: 3.4 MMOL/L
PROT SERPL-MCNC: 5.3 G/DL
PROTHROMBIN TIME: 18.4 SEC
RBC # BLD AUTO: 4.1 M/UL
RETIRED EF AND QEF - SEE NOTES: 10 (ref 55–65)
SAMPLE: ABNORMAL
SITE: ABNORMAL
SODIUM SERPL-SCNC: 133 MMOL/L
TRICUSPID VALVE REGURGITATION: ABNORMAL
WBC # BLD AUTO: 10.82 K/UL

## 2017-02-04 PROCEDURE — 85025 COMPLETE CBC W/AUTO DIFF WBC: CPT

## 2017-02-04 PROCEDURE — 63600175 PHARM REV CODE 636 W HCPCS

## 2017-02-04 PROCEDURE — 80053 COMPREHEN METABOLIC PANEL: CPT

## 2017-02-04 PROCEDURE — 93306 TTE W/DOPPLER COMPLETE: CPT | Mod: 26,,, | Performed by: INTERNAL MEDICINE

## 2017-02-04 PROCEDURE — 25000003 PHARM REV CODE 250

## 2017-02-04 PROCEDURE — 93306 TTE W/DOPPLER COMPLETE: CPT

## 2017-02-04 PROCEDURE — 90792 PSYCH DIAG EVAL W/MED SRVCS: CPT | Mod: ,,, | Performed by: PSYCHIATRY & NEUROLOGY

## 2017-02-04 PROCEDURE — 27000202 HC IABP, ADD'L DAY

## 2017-02-04 PROCEDURE — 20000000 HC ICU ROOM

## 2017-02-04 PROCEDURE — 25000003 PHARM REV CODE 250: Performed by: INTERNAL MEDICINE

## 2017-02-04 PROCEDURE — 85610 PROTHROMBIN TIME: CPT

## 2017-02-04 PROCEDURE — 83735 ASSAY OF MAGNESIUM: CPT

## 2017-02-04 PROCEDURE — 99233 SBSQ HOSP IP/OBS HIGH 50: CPT | Mod: ,,, | Performed by: INTERNAL MEDICINE

## 2017-02-04 PROCEDURE — 63600175 PHARM REV CODE 636 W HCPCS: Performed by: INTERNAL MEDICINE

## 2017-02-04 PROCEDURE — 25000003 PHARM REV CODE 250: Performed by: NURSE PRACTITIONER

## 2017-02-04 RX ORDER — FUROSEMIDE 10 MG/ML
80 INJECTION INTRAMUSCULAR; INTRAVENOUS ONCE
Status: COMPLETED | OUTPATIENT
Start: 2017-02-04 | End: 2017-02-04

## 2017-02-04 RX ORDER — FUROSEMIDE 10 MG/ML
80 INJECTION INTRAMUSCULAR; INTRAVENOUS 2 TIMES DAILY
Status: DISCONTINUED | OUTPATIENT
Start: 2017-02-04 | End: 2017-02-04

## 2017-02-04 RX ORDER — POTASSIUM CHLORIDE 20 MEQ/1
60 TABLET, EXTENDED RELEASE ORAL ONCE
Status: COMPLETED | OUTPATIENT
Start: 2017-02-04 | End: 2017-02-04

## 2017-02-04 RX ADMIN — POTASSIUM CHLORIDE 20 MEQ: 1500 TABLET, EXTENDED RELEASE ORAL at 09:02

## 2017-02-04 RX ADMIN — GABAPENTIN 800 MG: 400 CAPSULE ORAL at 09:02

## 2017-02-04 RX ADMIN — CLONAZEPAM 0.5 MG: 0.5 TABLET ORAL at 10:02

## 2017-02-04 RX ADMIN — ASPIRIN 81 MG: 81 TABLET, COATED ORAL at 09:02

## 2017-02-04 RX ADMIN — CLOPIDOGREL 75 MG: 75 TABLET, FILM COATED ORAL at 09:02

## 2017-02-04 RX ADMIN — OXYCODONE HYDROCHLORIDE 5 MG: 5 TABLET ORAL at 11:02

## 2017-02-04 RX ADMIN — CLONAZEPAM 0.5 MG: 0.5 TABLET ORAL at 09:02

## 2017-02-04 RX ADMIN — DOBUTAMINE IN DEXTROSE 5 MCG/KG/MIN: 200 INJECTION, SOLUTION INTRAVENOUS at 06:02

## 2017-02-04 RX ADMIN — FUROSEMIDE 80 MG: 10 INJECTION, SOLUTION INTRAMUSCULAR; INTRAVENOUS at 06:02

## 2017-02-04 RX ADMIN — OXYCODONE HYDROCHLORIDE 5 MG: 5 TABLET ORAL at 06:02

## 2017-02-04 RX ADMIN — MIRTAZAPINE 45 MG: 15 TABLET, FILM COATED ORAL at 10:02

## 2017-02-04 RX ADMIN — GABAPENTIN 800 MG: 400 CAPSULE ORAL at 10:02

## 2017-02-04 RX ADMIN — POTASSIUM CHLORIDE 60 MEQ: 1500 TABLET, EXTENDED RELEASE ORAL at 06:02

## 2017-02-04 RX ADMIN — SPIRONOLACTONE 25 MG: 25 TABLET, FILM COATED ORAL at 09:02

## 2017-02-04 RX ADMIN — LISINOPRIL 2.5 MG: 2.5 TABLET ORAL at 09:02

## 2017-02-04 NOTE — PLAN OF CARE
Problem: Patient Care Overview  Goal: Individualization & Mutuality  Outcome: Ongoing (interventions implemented as appropriate)  No acute events today.  VSS. IABP remains 1:1 ECG.  Lasix and dobutamine continue.  Initiated LVAD workup.  POC d/w spouse and patient.  Will continue to monitor.

## 2017-02-04 NOTE — MEDICAL/APP STUDENT
2/4/2017 11:37 AM   Urbano Hensley   1970   [id number]        Psychiatric H&P     Chief complaint/Reason for consult: Past psychiatric history of schizophrenia    SUBJECTIVE:   HPI:   Urbano Hensley is a 46 y.o. male with past psychiatric history of bipolar disorder, paranoid schizophrenia, ADHD, and antisocial personality disorder, currently admitted with Cardiogenic shock.  Psychiatry has been consulted to address schizophrenia and discussion of Invega shot which he receives once every three months, which he is due for.    Mr. Hensley has no SI/HI or AVH currently. He reports that he is due for his Invega shot with Dr. Salazar in [place] in one month. He receives them every 3 months and he reports that it has been 2 months. He will follow up with Dr. Salazar in [place] as planned. He asked repeatedly why psychiatry was speaking with him, insisted he is not crazy and is treated appropriately, and said he did not want anything from psychiatry and has no interest in speaking with psychiatry.      Collateral:   Girlfriend of 3 years was in the room. Mr. Hensley answered most of the questions.     Medical Review Of Systems:  Did not assess    Current Medications:   Scheduled Meds:    aspirin  81 mg Oral Daily    clonazePAM  0.5 mg Oral BID    clopidogrel  75 mg Oral Daily    furosemide  80 mg Intravenous BID    gabapentin  800 mg Oral BID    lisinopril  2.5 mg Oral BID    mirtazapine  45 mg Oral QHS    potassium chloride SA  20 mEq Oral BID    spironolactone  25 mg Oral Daily      PRN Meds: cyclobenzaprine, morphine, omnipaque, oxycodone   Psychotherapeutics     Start     Stop Route Frequency Ordered    02/03/17 0245  mirtazapine tablet 45 mg      -- Oral Nightly 02/03/17 0145          Allergies:   Review of patient's allergies indicates:  No Known Allergies     Past Medical/Surgical History:   Past Medical History   Diagnosis Date    Acute on chronic systolic congestive heart failure 2/4/2017    Bipolar 1  "disorder     Coronary artery disease     Coronary artery disease involving native coronary artery of native heart without angina pectoris 2017    Ischemic cardiomyopathy 2017    Paranoid schizophrenia      Past Surgical History   Procedure Laterality Date    Cardiac surgery      Shoulder surgery          Past Psychiatric History:   Previous Psychiatric Hospitalizations: Denies hospitalizations related to psych  Previous Medication Trials: Klonapin 0.5 mg. Has tried Mirtazapine (Remeron) in the past and does not like it. It makes him feel groggy in the morning. He does not wish to use it.  Previous Suicide Attempts: None  History of Violence: Yes, fighting due to alcohol  Outpatient psychiatrist: Dr. Salazar in [place], sees once a month    Nerurological History:   Seizures: No  Head trauma: No    Social History:   Developmental: Grew up in [place], [place]  Education: Year 12 High School, currently taking university level classes online for a business degree  Special Ed: No  Employment Status/Info: Not employed  Financial: Disability  Marital Status: Girlfriend of 3 years  Children: None  Housing Status: Lives with Girlfriend, hour and a half away   history: None  History of phys/sexual abuse: Did not assess  Access to gun: No  Roman Catholic: Latter-day    Substance Abuse History:   Recreational Drugs: Denies use of recreational drugs  Use of Alcohol: Denies use of alcohol  Tobacco Use: Quit smoking 2 weeks ago  Rehab History: Has not had a history with rehab    Legal History:   Past Charges/Incarcerations: Was in [facility] for 3-4 years for violence"  Pending charges: None    Family Psychiatric History:   Father is , did not report any psychiatric illness  Mother has depression, patient reports that he believes mother has bipolar disorder  Sister does not have any psychiatric illness    OBJECTIVE:   Vitals   Vitals:    17 1100   BP: 100/74   Pulse: 88   Resp: 13   Temp:     "     Labs/Imaging/Studies:   Recent Results (from the past 48 hour(s))   Comprehensive metabolic panel    Collection Time: 02/02/17  9:47 PM   Result Value Ref Range    Sodium 125 (L) 136 - 145 mmol/L    Potassium 5.9 (H) 3.5 - 5.1 mmol/L    Chloride 93 (L) 95 - 110 mmol/L    CO2 21 (L) 23 - 29 mmol/L    Glucose 96 70 - 110 mg/dL    BUN, Bld 54 (H) 6 - 20 mg/dL    Creatinine 1.4 0.5 - 1.4 mg/dL    Calcium 8.8 8.7 - 10.5 mg/dL    Total Protein 6.1 6.0 - 8.4 g/dL    Albumin 3.2 (L) 3.5 - 5.2 g/dL    Total Bilirubin 2.4 (H) 0.1 - 1.0 mg/dL    Alkaline Phosphatase 181 (H) 55 - 135 U/L     (H) 10 - 40 U/L    ALT 1061 (H) 10 - 44 U/L    Anion Gap 11 8 - 16 mmol/L    eGFR if African American >60.0 >60 mL/min/1.73 m^2    eGFR if non  59.4 (A) >60 mL/min/1.73 m^2   Magnesium    Collection Time: 02/02/17  9:47 PM   Result Value Ref Range    Magnesium 2.2 1.6 - 2.6 mg/dL   Phosphorus    Collection Time: 02/02/17  9:47 PM   Result Value Ref Range    Phosphorus 3.8 2.7 - 4.5 mg/dL   Brain natriuretic peptide    Collection Time: 02/02/17  9:47 PM   Result Value Ref Range    BNP 2917 (H) 0 - 99 pg/mL   Protime-INR    Collection Time: 02/02/17  9:47 PM   Result Value Ref Range    Prothrombin Time 22.6 (H) 9.0 - 12.5 sec    INR 2.3 (H) 0.8 - 1.2   ISTAT PROCEDURE    Collection Time: 02/03/17 12:20 AM   Result Value Ref Range    POC PH 7.401 7.35 - 7.45    POC PCO2 41.4 35 - 45 mmHg    POC PO2 30 (LL) 40 - 60 mmHg    POC HCO3 25.7 24 - 28 mmol/L    POC BE 1 -2 to 2 mmol/L    POC SATURATED O2 57 (L) 95 - 100 %    POC pH Temp 7.401     POC pCO2 Temp 41.4 mmHg    POC pO2 Temp 30 mmHg    POC TCO2 27 24 - 29 mmol/L    Sample VENOUS     Site Other     Allens Test Pass     POC Temp 37.0 C    CBC auto differential    Collection Time: 02/03/17  3:52 AM   Result Value Ref Range    WBC 9.39 3.90 - 12.70 K/uL    RBC 4.18 (L) 4.60 - 6.20 M/uL    Hemoglobin 11.7 (L) 14.0 - 18.0 g/dL    Hematocrit 35.1 (L) 40.0 - 54.0 %    MCV  84 82 - 98 fL    MCH 28.0 27.0 - 31.0 pg    MCHC 33.3 32.0 - 36.0 %    RDW 15.1 (H) 11.5 - 14.5 %    Platelets 189 150 - 350 K/uL    MPV 11.7 9.2 - 12.9 fL    Gran # 7.4 1.8 - 7.7 K/uL    Lymph # 0.9 (L) 1.0 - 4.8 K/uL    Mono # 1.0 0.3 - 1.0 K/uL    Eos # 0.0 0.0 - 0.5 K/uL    Baso # 0.00 0.00 - 0.20 K/uL    Gran% 78.9 (H) 38.0 - 73.0 %    Lymph% 9.7 (L) 18.0 - 48.0 %    Mono% 11.1 4.0 - 15.0 %    Eosinophil% 0.0 0.0 - 8.0 %    Basophil% 0.0 0.0 - 1.9 %    Differential Method Automated    Magnesium    Collection Time: 02/03/17  3:52 AM   Result Value Ref Range    Magnesium 2.3 1.6 - 2.6 mg/dL   Protime-INR    Collection Time: 02/03/17  3:52 AM   Result Value Ref Range    Prothrombin Time 21.2 (H) 9.0 - 12.5 sec    INR 2.1 (H) 0.8 - 1.2   Basic metabolic panel    Collection Time: 02/03/17  3:52 AM   Result Value Ref Range    Sodium 128 (L) 136 - 145 mmol/L    Potassium 3.9 3.5 - 5.1 mmol/L    Chloride 91 (L) 95 - 110 mmol/L    CO2 29 23 - 29 mmol/L    Glucose 110 70 - 110 mg/dL    BUN, Bld 50 (H) 6 - 20 mg/dL    Creatinine 1.2 0.5 - 1.4 mg/dL    Calcium 8.6 (L) 8.7 - 10.5 mg/dL    Anion Gap 8 8 - 16 mmol/L    eGFR if African American >60.0 >60 mL/min/1.73 m^2    eGFR if non African American >60.0 >60 mL/min/1.73 m^2   ISTAT PROCEDURE    Collection Time: 02/03/17  6:28 AM   Result Value Ref Range    POC PH 7.429 7.35 - 7.45    POC PCO2 42.7 35 - 45 mmHg    POC PO2 35 (LL) 40 - 60 mmHg    POC HCO3 28.3 (H) 24 - 28 mmol/L    POC BE 4 -2 to 2 mmol/L    POC SATURATED O2 69 (L) 95 - 100 %    POC TCO2 30 (H) 24 - 29 mmol/L    Sample VENOUS     Site Other     Allens Test N/A    Basic metabolic panel    Collection Time: 02/03/17  1:16 PM   Result Value Ref Range    Sodium 133 (L) 136 - 145 mmol/L    Potassium 3.2 (L) 3.5 - 5.1 mmol/L    Chloride 92 (L) 95 - 110 mmol/L    CO2 33 (H) 23 - 29 mmol/L    Glucose 94 70 - 110 mg/dL    BUN, Bld 42 (H) 6 - 20 mg/dL    Creatinine 1.1 0.5 - 1.4 mg/dL    Calcium 8.0 (L) 8.7 - 10.5 mg/dL     Anion Gap 8 8 - 16 mmol/L    eGFR if African American >60.0 >60 mL/min/1.73 m^2    eGFR if non African American >60.0 >60 mL/min/1.73 m^2   Magnesium    Collection Time: 02/03/17  1:16 PM   Result Value Ref Range    Magnesium 1.8 1.6 - 2.6 mg/dL   Magnesium    Collection Time: 02/04/17  4:37 AM   Result Value Ref Range    Magnesium 2.4 1.6 - 2.6 mg/dL   CBC auto differential    Collection Time: 02/04/17  4:37 AM   Result Value Ref Range    WBC 10.82 3.90 - 12.70 K/uL    RBC 4.10 (L) 4.60 - 6.20 M/uL    Hemoglobin 11.4 (L) 14.0 - 18.0 g/dL    Hematocrit 34.8 (L) 40.0 - 54.0 %    MCV 85 82 - 98 fL    MCH 27.8 27.0 - 31.0 pg    MCHC 32.8 32.0 - 36.0 %    RDW 15.1 (H) 11.5 - 14.5 %    Platelets 185 150 - 350 K/uL    MPV 11.0 9.2 - 12.9 fL    Gran # 8.4 (H) 1.8 - 7.7 K/uL    Lymph # 1.3 1.0 - 4.8 K/uL    Mono # 1.2 (H) 0.3 - 1.0 K/uL    Eos # 0.0 0.0 - 0.5 K/uL    Baso # 0.00 0.00 - 0.20 K/uL    Gran% 77.1 (H) 38.0 - 73.0 %    Lymph% 11.6 (L) 18.0 - 48.0 %    Mono% 11.0 4.0 - 15.0 %    Eosinophil% 0.1 0.0 - 8.0 %    Basophil% 0.0 0.0 - 1.9 %    Differential Method Automated    Protime-INR    Collection Time: 02/04/17  4:37 AM   Result Value Ref Range    Prothrombin Time 18.4 (H) 9.0 - 12.5 sec    INR 1.8 (H) 0.8 - 1.2   Comprehensive metabolic panel    Collection Time: 02/04/17  4:37 AM   Result Value Ref Range    Sodium 133 (L) 136 - 145 mmol/L    Potassium 3.4 (L) 3.5 - 5.1 mmol/L    Chloride 92 (L) 95 - 110 mmol/L    CO2 31 (H) 23 - 29 mmol/L    Glucose 87 70 - 110 mg/dL    BUN, Bld 36 (H) 6 - 20 mg/dL    Creatinine 1.0 0.5 - 1.4 mg/dL    Calcium 7.8 (L) 8.7 - 10.5 mg/dL    Total Protein 5.3 (L) 6.0 - 8.4 g/dL    Albumin 2.8 (L) 3.5 - 5.2 g/dL    Total Bilirubin 2.7 (H) 0.1 - 1.0 mg/dL    Alkaline Phosphatase 165 (H) 55 - 135 U/L     (H) 10 - 40 U/L     (H) 10 - 44 U/L    Anion Gap 10 8 - 16 mmol/L    eGFR if African American >60.0 >60 mL/min/1.73 m^2    eGFR if non African American >60.0 >60  "mL/min/1.73 m^2   ISTAT PROCEDURE    Collection Time: 02/04/17  6:28 AM   Result Value Ref Range    POC PH 7.446 7.35 - 7.45    POC PCO2 48.1 (H) 35 - 45 mmHg    POC PO2 33 (LL) 40 - 60 mmHg    POC HCO3 33.1 (H) 24 - 28 mmol/L    POC BE 9 -2 to 2 mmol/L    POC SATURATED O2 65 (L) 95 - 100 %    POC TCO2 35 (H) 24 - 29 mmol/L    Sample VENOUS     Site Other     Allens Test N/A     DelSys Room Air       No results found for: PHENYTOIN, PHENOBARB, VALPROATE, CBMZ      Physical Exam:   No exam performed today, patient in ICU.    Mental Status Exam:   Arousal: Kept falling asleep between questions. Became more alert towards the end.  Appearance: Appropriate for situation (ICU)  Sensorium/Orientation: Oriented to person, place, and time.  Grooming: Fair for situation  Behavior/Cooperation: Would answer questions reluctantly but was annoyed and did not understand why he was speaking to psychiatry, asked to be left alone  Speech: slow, normal tone, decreased volume  Language: Fluent English  Mood: "Annoyed"  Affect: Reactive, irritated   Thought Process: Normal, linear  Thought Content: Appropriate  Associations: Did not assess  Attention/Concentration: Attentive  Memory: Did not assess  Fund of Knowledge: Appropriate  Insight: Appropriate  Judgment: Appropriate     ASSESSMENT/PLAN:   Mr. Hensley is a 46 year old man with a past psychiatric history of schizophrenia, bipolar disorder, and ADHD. He is treated with Clonazepam .5mg and an Invega shot once every 3 months.     Past Medical History   Diagnosis Date    Acute on chronic systolic congestive heart failure 2/4/2017    Bipolar 1 disorder     Coronary artery disease     Coronary artery disease involving native coronary artery of native heart without angina pectoris 2/4/2017    Ischemic cardiomyopathy 2/4/2017    Paranoid schizophrenia        Recommendations:    - Continue Clonazepam 0.5mg  - Patient does not want to be on Mirtazapine due to side effects  - Allow " patient to follow up with his outpatient psychiatrist and receive his Invega shot in one month       Marva Ferrer M.D.  2/4/2017       Staff note : I, Andrey Hassan MD have personally seen and evaluated the patient , and reviewed the above history and exam. I agree and concur with the above assessment and plan.

## 2017-02-04 NOTE — PROGRESS NOTES
"Hospital Day: 3    Subjective:  Interval History: C/o's back pain which is chronic, not new. Otherwise, diuresing very well. Lasix d/c'd overnight     Scheduled Meds:   aspirin  81 mg Oral Daily    clonazePAM  0.5 mg Oral BID    clopidogrel  75 mg Oral Daily    furosemide  80 mg Intravenous Once    gabapentin  800 mg Oral BID    lisinopril  2.5 mg Oral BID    mirtazapine  45 mg Oral QHS    potassium chloride SA  20 mEq Oral BID    potassium chloride SA  60 mEq Oral Once    spironolactone  25 mg Oral Daily     Continuous Infusions:   DOBUTamine 5 mcg/kg/min (02/04/17 0605)    furosemide (LASIX) 1 mg/mL infusion (non-titrating) Stopped (02/03/17 2200)    furosemide (LASIX) 1 mg/mL infusion (non-titrating)       PRN Meds:    Objective:  Vital signs in last 24 hours:   Temp:  [97.6 °F (36.4 °C)-98.6 °F (37 °C)] 97.8 °F (36.6 °C)  Pulse:  [74-81] 77  Resp:  [11-65] 12  SpO2:  [92 %-100 %] 99 %  BP: ()/(51-82) 101/62    Intake/Output last 3 shifts:  I/O last 3 completed shifts:  In: 1317.3 [P.O.:655; I.V.:562.3; IV Piggyback:100]  Out: 9185 [Urine:9185]  Intake/Output this shift:  I/O this shift:  In: 164.7 [I.V.:164.7]  Out: 1260 [Urine:1260]    Physical Exam:  Visit Vitals    /62    Pulse 77    Temp 97.8 °F (36.6 °C) (Axillary)    Resp 12    Ht 5' 11" (1.803 m)    Wt 71.9 kg (158 lb 8.2 oz)    SpO2 99%    BMI 22.11 kg/m2     General appearance: alert, appears stated age and cooperative  Neck: supple, symmetrical, trachea midline and Line in place  Lungs: diminished breath sounds bibasilar  Heart: regular rate and rhythm  Abdomen: soft, non-tender; bowel sounds normal; no masses,  no organomegaly  Extremities: extremities normal, atraumatic, no cyanosis or edema    Lab Review  Lab Results   Component Value Date     (L) 02/04/2017    K 3.4 (L) 02/04/2017    CL 92 (L) 02/04/2017    CO2 31 (H) 02/04/2017    BUN 36 (H) 02/04/2017    CREATININE 1.0 02/04/2017    CALCIUM 7.8 (L) " 02/04/2017     Lab Results   Component Value Date    WBC 10.82 02/04/2017    HGB 11.4 (L) 02/04/2017    HCT 34.8 (L) 02/04/2017    MCV 85 02/04/2017     02/04/2017        Assessment/Plan:  46 y.o. gentleman with PMH of ICM (EF=15%), CAD (s/p PCI), HLD, HTN, depression and schizophrenia that presents from OSH for management of cardiogenic shock and work-up of advanced options. IABP and R IJ CVC placed upon arrival for hemodynamic support and invasive monitoring of CVP.      Plan:   Cardiogenic shock  - Continue IABP 1:1.  - Continue  5mcg/kg/mn.  - Diuresing well. Lasix gtt d/c'd last night d/t hypotension, CVP 8; CVP 10 this am; Resume lower dose    - 2D echo w/ CFD still pending  - Will continue titration of GDMT as tolerates for afterload reduction.  - Holding B-blocker with cardiogenic shock  - Currently with no ICD  - W/u suspended yesterday d/t schizophrenia and non-compliance     CAD s/p PCI  - Continue statin, DAPT  - Obtain reports from OSH regarding previous PCIs and when last one was put in  - Continue high dose statin     Hx of Depression / Anxiety/Schizophrenia  - Continue home mirtazapine and klonopin  - Will order Invega once wife verifies dose     Hypokalemia  -Replete K

## 2017-02-04 NOTE — PROGRESS NOTES
BP low, lisinopril held and lasix gtt stopped per Dr. Fisher. CVP 9-10. Neg ~8L since admission. Monitoring.

## 2017-02-04 NOTE — PLAN OF CARE
Problem: Patient Care Overview  Goal: Individualization & Mutuality  Outcome: Ongoing (interventions implemented as appropriate)  No acute events. VSS.  Most recent CVP is 5.  Lasix gtt off.  UO adequate.  Appetite has returned.  Remains lethargic.  Will present for LVAD on Wednesday.  POC d/w patient and spouse.  Will continue to monitor.

## 2017-02-05 LAB
ALBUMIN SERPL BCP-MCNC: 2.6 G/DL
ALLENS TEST: ABNORMAL
ALP SERPL-CCNC: 174 U/L
ALT SERPL W/O P-5'-P-CCNC: 547 U/L
ANION GAP SERPL CALC-SCNC: 4 MMOL/L
AST SERPL-CCNC: 310 U/L
BASOPHILS # BLD AUTO: 0.01 K/UL
BASOPHILS NFR BLD: 0.1 %
BILIRUB SERPL-MCNC: 2.1 MG/DL
BUN SERPL-MCNC: 32 MG/DL
CALCIUM SERPL-MCNC: 8.1 MG/DL
CHLORIDE SERPL-SCNC: 93 MMOL/L
CO2 SERPL-SCNC: 34 MMOL/L
CREAT SERPL-MCNC: 0.9 MG/DL
DIFFERENTIAL METHOD: ABNORMAL
EOSINOPHIL # BLD AUTO: 0 K/UL
EOSINOPHIL NFR BLD: 0.3 %
ERYTHROCYTE [DISTWIDTH] IN BLOOD BY AUTOMATED COUNT: 15.4 %
EST. GFR  (AFRICAN AMERICAN): >60 ML/MIN/1.73 M^2
EST. GFR  (NON AFRICAN AMERICAN): >60 ML/MIN/1.73 M^2
GLUCOSE SERPL-MCNC: 107 MG/DL
HCO3 UR-SCNC: 33.8 MMOL/L (ref 24–28)
HCT VFR BLD AUTO: 35.1 %
HGB BLD-MCNC: 11.5 G/DL
INR PPP: 1.5
LYMPHOCYTES # BLD AUTO: 1.1 K/UL
LYMPHOCYTES NFR BLD: 8.7 %
MAGNESIUM SERPL-MCNC: 2.1 MG/DL
MCH RBC QN AUTO: 27.9 PG
MCHC RBC AUTO-ENTMCNC: 32.8 %
MCV RBC AUTO: 85 FL
MONOCYTES # BLD AUTO: 1.4 K/UL
MONOCYTES NFR BLD: 10.7 %
NEUTROPHILS # BLD AUTO: 10.1 K/UL
NEUTROPHILS NFR BLD: 80 %
PCO2 BLDA: 51.4 MMHG (ref 35–45)
PH SMN: 7.42 [PH] (ref 7.35–7.45)
PLATELET # BLD AUTO: 169 K/UL
PMV BLD AUTO: 10.2 FL
PO2 BLDA: 36 MMHG (ref 40–60)
POC BE: 9 MMOL/L
POC SATURATED O2: 69 % (ref 95–100)
POC TCO2: 35 MMOL/L (ref 24–29)
POTASSIUM SERPL-SCNC: 3.9 MMOL/L
PROT SERPL-MCNC: 5.3 G/DL
PROTHROMBIN TIME: 15.1 SEC
RBC # BLD AUTO: 4.12 M/UL
SAMPLE: ABNORMAL
SITE: ABNORMAL
SODIUM SERPL-SCNC: 131 MMOL/L
WBC # BLD AUTO: 12.67 K/UL

## 2017-02-05 PROCEDURE — 85025 COMPLETE CBC W/AUTO DIFF WBC: CPT

## 2017-02-05 PROCEDURE — 99233 SBSQ HOSP IP/OBS HIGH 50: CPT | Mod: ,,, | Performed by: INTERNAL MEDICINE

## 2017-02-05 PROCEDURE — 85610 PROTHROMBIN TIME: CPT

## 2017-02-05 PROCEDURE — 25000003 PHARM REV CODE 250: Performed by: INTERNAL MEDICINE

## 2017-02-05 PROCEDURE — 27000221 HC OXYGEN, UP TO 24 HOURS

## 2017-02-05 PROCEDURE — 94761 N-INVAS EAR/PLS OXIMETRY MLT: CPT

## 2017-02-05 PROCEDURE — 82803 BLOOD GASES ANY COMBINATION: CPT

## 2017-02-05 PROCEDURE — 63600175 PHARM REV CODE 636 W HCPCS: Performed by: INTERNAL MEDICINE

## 2017-02-05 PROCEDURE — 27000202 HC IABP, ADD'L DAY

## 2017-02-05 PROCEDURE — 99900035 HC TECH TIME PER 15 MIN (STAT)

## 2017-02-05 PROCEDURE — 80053 COMPREHEN METABOLIC PANEL: CPT

## 2017-02-05 PROCEDURE — 25000003 PHARM REV CODE 250: Performed by: NURSE PRACTITIONER

## 2017-02-05 PROCEDURE — 83735 ASSAY OF MAGNESIUM: CPT

## 2017-02-05 PROCEDURE — 20000000 HC ICU ROOM

## 2017-02-05 RX ORDER — PHENYLEPHRINE HCL IN 0.9% NACL 1 MG/10 ML
SYRINGE (ML) INTRAVENOUS
Status: DISPENSED
Start: 2017-02-05 | End: 2017-02-06

## 2017-02-05 RX ADMIN — CLOPIDOGREL 75 MG: 75 TABLET, FILM COATED ORAL at 08:02

## 2017-02-05 RX ADMIN — OXYCODONE HYDROCHLORIDE 5 MG: 5 TABLET ORAL at 09:02

## 2017-02-05 RX ADMIN — CLONAZEPAM 0.5 MG: 0.5 TABLET ORAL at 08:02

## 2017-02-05 RX ADMIN — SPIRONOLACTONE 25 MG: 25 TABLET, FILM COATED ORAL at 08:02

## 2017-02-05 RX ADMIN — ASPIRIN 81 MG: 81 TABLET, COATED ORAL at 08:02

## 2017-02-05 RX ADMIN — CLONAZEPAM 0.5 MG: 0.5 TABLET ORAL at 09:02

## 2017-02-05 RX ADMIN — GABAPENTIN 800 MG: 400 CAPSULE ORAL at 08:02

## 2017-02-05 RX ADMIN — CYCLOBENZAPRINE HYDROCHLORIDE 5 MG: 5 TABLET, FILM COATED ORAL at 11:02

## 2017-02-05 RX ADMIN — OXYCODONE HYDROCHLORIDE 5 MG: 5 TABLET ORAL at 04:02

## 2017-02-05 RX ADMIN — DOBUTAMINE IN DEXTROSE 5 MCG/KG/MIN: 200 INJECTION, SOLUTION INTRAVENOUS at 06:02

## 2017-02-05 RX ADMIN — LISINOPRIL 2.5 MG: 2.5 TABLET ORAL at 08:02

## 2017-02-05 RX ADMIN — MIRTAZAPINE 45 MG: 15 TABLET, FILM COATED ORAL at 08:02

## 2017-02-05 NOTE — CONSULTS
2/4/2017 7:19 PM   Urbano Hensley   1970   [id number]        Psychiatric H&P     Chief complaint/Reason for consult: Schizophrenia    SUBJECTIVE:   HPI:   Urbano Hensley is a 46 y.o. male with past psychiatric history of Schizophrenia, currently admitted with Cardiogenic shock.  Patient seen in conjunction with medical student, and our findings are incorporated below:    Mr. Hensley has no SI/HI or AVH currently. He reports that he is due for his Invega shot with Dr. Salazar ( [facility] psychiatrist)  in [place] in one month. He receives them every 3 months and he reports that it has been 2 months since last injection. He has follow-up scheduled for next month for his next injection. He reports his mood is irritated, but is cooperative with interview. His sleep is fair. Appetite is good.    Collateral:   Patient interviewed in the presence of his Girlfriend and Sister. They concur with the information documented above. Family states that it is clear when he is having psychotic symptoms as he begins to hallucinate and act paranoid, but they haven't noticed any of these symptoms recently.      Current Medications:   Scheduled Meds:    aspirin  81 mg Oral Daily    clonazePAM  0.5 mg Oral BID    clopidogrel  75 mg Oral Daily    gabapentin  800 mg Oral BID    lisinopril  2.5 mg Oral BID    mirtazapine  45 mg Oral QHS    spironolactone  25 mg Oral Daily      PRN Meds: cyclobenzaprine, morphine, omnipaque, oxycodone   Psychotherapeutics     Start     Stop Route Frequency Ordered    02/03/17 0245  mirtazapine tablet 45 mg      -- Oral Nightly 02/03/17 0145          Allergies:   Review of patient's allergies indicates:  No Known Allergies     Past Medical/Surgical History:   Past Medical History   Diagnosis Date    Acute on chronic systolic congestive heart failure 2/4/2017    Bipolar 1 disorder     Coronary artery disease     Coronary artery disease involving native coronary artery of native heart  "without angina pectoris 2017    Ischemic cardiomyopathy 2017    Paranoid schizophrenia      Past Surgical History   Procedure Laterality Date    Cardiac surgery      Shoulder surgery        Past Psychiatric History:   Previous Psychiatric Hospitalizations: Denies hospitalizations related to psych  Previous Medication Trials: Klonapin 0.5 mg. Has tried Mirtazapine (Remeron) in the past and does not like it. It makes him feel groggy in the morning. He does not wish to use it.  Previous Suicide Attempts: None  History of Violence: Yes, fighting due to alcohol  Outpatient psychiatrist: Dr. Salazar in [place], sees once a month     Nerurological History:   Seizures: No  Head trauma: No     Social History:   Developmental: Grew up in [place], [place]  Education: Year 12 High School, currently taking university level classes online for a business degree  Special Ed: No  Employment Status/Info: Not employed  Financial: Disability  Marital Status: Girlfriend of 3 years  Children: None  Housing Status: Lives with Girlfriend, hour and a half away   history: None  History of phys/sexual abuse: Did not assess  Access to gun: No  Anglican: Episcopalian     Substance Abuse History:   Recreational Drugs: Denies use of recreational drugs  Use of Alcohol: Denies use of alcohol  Tobacco Use: Quit smoking 2 weeks ago  Rehab History: Has not had a history with rehab     Legal History:   Past Charges/Incarcerations: Was in custodial for 3-4 years for violence"  Pending charges: None     Family Psychiatric History:   Father is , did not report any psychiatric illness  Mother has depression, patient reports that he believes mother has bipolar disorder  Sister does not have any psychiatric illness    OBJECTIVE:   Vitals   Vitals:    17 1902   BP: (!) 88/55   Pulse: 89   Resp: 13   Temp: 98.4 °F (36.9 °C)        Labs/Imaging/Studies:   Recent Results (from the past 48 hour(s))   Comprehensive metabolic panel    " Collection Time: 02/02/17  9:47 PM   Result Value Ref Range    Sodium 125 (L) 136 - 145 mmol/L    Potassium 5.9 (H) 3.5 - 5.1 mmol/L    Chloride 93 (L) 95 - 110 mmol/L    CO2 21 (L) 23 - 29 mmol/L    Glucose 96 70 - 110 mg/dL    BUN, Bld 54 (H) 6 - 20 mg/dL    Creatinine 1.4 0.5 - 1.4 mg/dL    Calcium 8.8 8.7 - 10.5 mg/dL    Total Protein 6.1 6.0 - 8.4 g/dL    Albumin 3.2 (L) 3.5 - 5.2 g/dL    Total Bilirubin 2.4 (H) 0.1 - 1.0 mg/dL    Alkaline Phosphatase 181 (H) 55 - 135 U/L     (H) 10 - 40 U/L    ALT 1061 (H) 10 - 44 U/L    Anion Gap 11 8 - 16 mmol/L    eGFR if African American >60.0 >60 mL/min/1.73 m^2    eGFR if non  59.4 (A) >60 mL/min/1.73 m^2   Magnesium    Collection Time: 02/02/17  9:47 PM   Result Value Ref Range    Magnesium 2.2 1.6 - 2.6 mg/dL   Phosphorus    Collection Time: 02/02/17  9:47 PM   Result Value Ref Range    Phosphorus 3.8 2.7 - 4.5 mg/dL   Brain natriuretic peptide    Collection Time: 02/02/17  9:47 PM   Result Value Ref Range    BNP 2917 (H) 0 - 99 pg/mL   Protime-INR    Collection Time: 02/02/17  9:47 PM   Result Value Ref Range    Prothrombin Time 22.6 (H) 9.0 - 12.5 sec    INR 2.3 (H) 0.8 - 1.2   ISTAT PROCEDURE    Collection Time: 02/03/17 12:20 AM   Result Value Ref Range    POC PH 7.401 7.35 - 7.45    POC PCO2 41.4 35 - 45 mmHg    POC PO2 30 (LL) 40 - 60 mmHg    POC HCO3 25.7 24 - 28 mmol/L    POC BE 1 -2 to 2 mmol/L    POC SATURATED O2 57 (L) 95 - 100 %    POC pH Temp 7.401     POC pCO2 Temp 41.4 mmHg    POC pO2 Temp 30 mmHg    POC TCO2 27 24 - 29 mmol/L    Sample VENOUS     Site Other     Allens Test Pass     POC Temp 37.0 C    CBC auto differential    Collection Time: 02/03/17  3:52 AM   Result Value Ref Range    WBC 9.39 3.90 - 12.70 K/uL    RBC 4.18 (L) 4.60 - 6.20 M/uL    Hemoglobin 11.7 (L) 14.0 - 18.0 g/dL    Hematocrit 35.1 (L) 40.0 - 54.0 %    MCV 84 82 - 98 fL    MCH 28.0 27.0 - 31.0 pg    MCHC 33.3 32.0 - 36.0 %    RDW 15.1 (H) 11.5 - 14.5 %     Platelets 189 150 - 350 K/uL    MPV 11.7 9.2 - 12.9 fL    Gran # 7.4 1.8 - 7.7 K/uL    Lymph # 0.9 (L) 1.0 - 4.8 K/uL    Mono # 1.0 0.3 - 1.0 K/uL    Eos # 0.0 0.0 - 0.5 K/uL    Baso # 0.00 0.00 - 0.20 K/uL    Gran% 78.9 (H) 38.0 - 73.0 %    Lymph% 9.7 (L) 18.0 - 48.0 %    Mono% 11.1 4.0 - 15.0 %    Eosinophil% 0.0 0.0 - 8.0 %    Basophil% 0.0 0.0 - 1.9 %    Differential Method Automated    Magnesium    Collection Time: 02/03/17  3:52 AM   Result Value Ref Range    Magnesium 2.3 1.6 - 2.6 mg/dL   Protime-INR    Collection Time: 02/03/17  3:52 AM   Result Value Ref Range    Prothrombin Time 21.2 (H) 9.0 - 12.5 sec    INR 2.1 (H) 0.8 - 1.2   Basic metabolic panel    Collection Time: 02/03/17  3:52 AM   Result Value Ref Range    Sodium 128 (L) 136 - 145 mmol/L    Potassium 3.9 3.5 - 5.1 mmol/L    Chloride 91 (L) 95 - 110 mmol/L    CO2 29 23 - 29 mmol/L    Glucose 110 70 - 110 mg/dL    BUN, Bld 50 (H) 6 - 20 mg/dL    Creatinine 1.2 0.5 - 1.4 mg/dL    Calcium 8.6 (L) 8.7 - 10.5 mg/dL    Anion Gap 8 8 - 16 mmol/L    eGFR if African American >60.0 >60 mL/min/1.73 m^2    eGFR if non African American >60.0 >60 mL/min/1.73 m^2   ISTAT PROCEDURE    Collection Time: 02/03/17  6:28 AM   Result Value Ref Range    POC PH 7.429 7.35 - 7.45    POC PCO2 42.7 35 - 45 mmHg    POC PO2 35 (LL) 40 - 60 mmHg    POC HCO3 28.3 (H) 24 - 28 mmol/L    POC BE 4 -2 to 2 mmol/L    POC SATURATED O2 69 (L) 95 - 100 %    POC TCO2 30 (H) 24 - 29 mmol/L    Sample VENOUS     Site Other     Allens Test N/A    Basic metabolic panel    Collection Time: 02/03/17  1:16 PM   Result Value Ref Range    Sodium 133 (L) 136 - 145 mmol/L    Potassium 3.2 (L) 3.5 - 5.1 mmol/L    Chloride 92 (L) 95 - 110 mmol/L    CO2 33 (H) 23 - 29 mmol/L    Glucose 94 70 - 110 mg/dL    BUN, Bld 42 (H) 6 - 20 mg/dL    Creatinine 1.1 0.5 - 1.4 mg/dL    Calcium 8.0 (L) 8.7 - 10.5 mg/dL    Anion Gap 8 8 - 16 mmol/L    eGFR if African American >60.0 >60 mL/min/1.73 m^2    eGFR if non  African American >60.0 >60 mL/min/1.73 m^2   Magnesium    Collection Time: 02/03/17  1:16 PM   Result Value Ref Range    Magnesium 1.8 1.6 - 2.6 mg/dL   Magnesium    Collection Time: 02/04/17  4:37 AM   Result Value Ref Range    Magnesium 2.4 1.6 - 2.6 mg/dL   CBC auto differential    Collection Time: 02/04/17  4:37 AM   Result Value Ref Range    WBC 10.82 3.90 - 12.70 K/uL    RBC 4.10 (L) 4.60 - 6.20 M/uL    Hemoglobin 11.4 (L) 14.0 - 18.0 g/dL    Hematocrit 34.8 (L) 40.0 - 54.0 %    MCV 85 82 - 98 fL    MCH 27.8 27.0 - 31.0 pg    MCHC 32.8 32.0 - 36.0 %    RDW 15.1 (H) 11.5 - 14.5 %    Platelets 185 150 - 350 K/uL    MPV 11.0 9.2 - 12.9 fL    Gran # 8.4 (H) 1.8 - 7.7 K/uL    Lymph # 1.3 1.0 - 4.8 K/uL    Mono # 1.2 (H) 0.3 - 1.0 K/uL    Eos # 0.0 0.0 - 0.5 K/uL    Baso # 0.00 0.00 - 0.20 K/uL    Gran% 77.1 (H) 38.0 - 73.0 %    Lymph% 11.6 (L) 18.0 - 48.0 %    Mono% 11.0 4.0 - 15.0 %    Eosinophil% 0.1 0.0 - 8.0 %    Basophil% 0.0 0.0 - 1.9 %    Differential Method Automated    Protime-INR    Collection Time: 02/04/17  4:37 AM   Result Value Ref Range    Prothrombin Time 18.4 (H) 9.0 - 12.5 sec    INR 1.8 (H) 0.8 - 1.2   Comprehensive metabolic panel    Collection Time: 02/04/17  4:37 AM   Result Value Ref Range    Sodium 133 (L) 136 - 145 mmol/L    Potassium 3.4 (L) 3.5 - 5.1 mmol/L    Chloride 92 (L) 95 - 110 mmol/L    CO2 31 (H) 23 - 29 mmol/L    Glucose 87 70 - 110 mg/dL    BUN, Bld 36 (H) 6 - 20 mg/dL    Creatinine 1.0 0.5 - 1.4 mg/dL    Calcium 7.8 (L) 8.7 - 10.5 mg/dL    Total Protein 5.3 (L) 6.0 - 8.4 g/dL    Albumin 2.8 (L) 3.5 - 5.2 g/dL    Total Bilirubin 2.7 (H) 0.1 - 1.0 mg/dL    Alkaline Phosphatase 165 (H) 55 - 135 U/L     (H) 10 - 40 U/L     (H) 10 - 44 U/L    Anion Gap 10 8 - 16 mmol/L    eGFR if African American >60.0 >60 mL/min/1.73 m^2    eGFR if non African American >60.0 >60 mL/min/1.73 m^2   ISTAT PROCEDURE    Collection Time: 02/04/17  6:28 AM   Result Value Ref Range    POC PH  "7.446 7.35 - 7.45    POC PCO2 48.1 (H) 35 - 45 mmHg    POC PO2 33 (LL) 40 - 60 mmHg    POC HCO3 33.1 (H) 24 - 28 mmol/L    POC BE 9 -2 to 2 mmol/L    POC SATURATED O2 65 (L) 95 - 100 %    POC TCO2 35 (H) 24 - 29 mmol/L    Sample VENOUS     Site Other     Allens Test N/A     DelSys Room Air    2D echo with color flow doppler    Collection Time: 02/04/17 10:00 AM   Result Value Ref Range    EF 10 (A) 55 - 65    Diastolic Dysfunction Yes (A)     Aortic Valve Regurgitation MILD     Est. PA Systolic Pressure 41 (A)     Tricuspid Valve Regurgitation MODERATE (A)       No results found for: PHENYTOIN, PHENOBARB, VALPROATE, CBMZ      Mental Status Exam:   Arousal: Drowsy  Appearance: Fair hygiene and grooming, wearing hospital gown  Sensorium/Orientation: Oriented to person, place and time  Behavior/Cooperation: Minimally cooperative   Psychomotor: No PMA or PMR  Speech: Slow, soft, normal rate and rhythym   Language: Fluent english  Mood: "Annoyed"   Affect: Reactive, mood congruent   Thought Process: Linear   Thought Content: No SI/HI or a/v hallucinations  Associations: Intact  Attention/Concentration: Intact  Memory: Recent and remote intact  Fund of Knowledge: Appropriate for education level   Insight: Fair   Judgment: Appropriate for setting     ASSESSMENT/PLAN:     Chronic schizophrenia    Recommendations:   -No current psychotic symptoms  -Patient and family state that patient is compliant with psychiatric follow-up (Dr. Salazar at  [facility] ), that patient receives Invega injection every 3 months (Invega trinza), and that symptoms are currently well controlled  -Recommend discontinue klonopin prior to discharge  -Recommend ensure close Psychiatric follow-up as outpatient at discharge  -Safe to discontinue remeron if patient desires, may try ramelteon    Case discussed and examined  with Dr. Hassan. Psychiatry will sign off, please reconsult if needed.    Greg Martel MD  [facility] " PGY2  2/4/2017  Pager: [phone]      Staff note : I, Andreyvilla Hassan MD have personally seen and evaluated the patient , and reviewed the above history and exam. I agree and concur with the above assessment and plan.Common law wife says pt is due for his injection in 1 month . She reports his mental stus is baseline .

## 2017-02-05 NOTE — PLAN OF CARE
Problem: Patient Care Overview  Goal: Individualization & Mutuality  Outcome: Ongoing (interventions implemented as appropriate)  No acute events today.  VSS.   IABP advanced to 1:2.  Remains lethargic.  No lasix given today.   Continue to monitor.  POC d/w spouse and patient.

## 2017-02-05 NOTE — PROGRESS NOTES
"Hospital Day: 4    Subjective:  Interval History: No c/o's overnight. Still with back pain but better.    Scheduled Meds:   aspirin  81 mg Oral Daily    clonazePAM  0.5 mg Oral BID    clopidogrel  75 mg Oral Daily    gabapentin  800 mg Oral BID    lisinopril  2.5 mg Oral BID    mirtazapine  45 mg Oral QHS    spironolactone  25 mg Oral Daily     Continuous Infusions:   DOBUTamine 5 mcg/kg/min (02/05/17 0608)     PRN Meds:    Objective:  Vital signs in last 24 hours:   Temp:  [97.5 °F (36.4 °C)-98.4 °F (36.9 °C)] 98.1 °F (36.7 °C)  Pulse:  [79-96] 87  Resp:  [11-28] 11  SpO2:  [95 %-100 %] 100 %  BP: ()/(50-74) 92/60    Intake/Output last 3 shifts:  I/O last 3 completed shifts:  In: 2298.5 [P.O.:1860; I.V.:438.5]  Out: 4925 [Urine:4925]  Intake/Output this shift:       Physical Exam:  Visit Vitals    BP 92/60    Pulse 87    Temp 98.1 °F (36.7 °C) (Axillary)    Resp 11    Ht 5' 11" (1.803 m)    Wt 71.9 kg (158 lb 8.2 oz)    SpO2 100%    BMI 22.11 kg/m2     General appearance: alert, appears stated age and cooperative  Neck: supple, symmetrical, trachea midline and Line in place  Lungs: diminished breath sounds bibasilar  Heart: regular rate and rhythm  Abdomen: soft, non-tender; bowel sounds normal; no masses,  no organomegaly  Extremities: extremities normal, atraumatic, no cyanosis or edema    Lab Review  Lab Results   Component Value Date     (L) 02/05/2017    K 3.9 02/05/2017    CL 93 (L) 02/05/2017    CO2 34 (H) 02/05/2017    BUN 32 (H) 02/05/2017    CREATININE 0.9 02/05/2017    CALCIUM 8.1 (L) 02/05/2017     Lab Results   Component Value Date    WBC 12.67 02/05/2017    HGB 11.5 (L) 02/05/2017    HCT 35.1 (L) 02/05/2017    MCV 85 02/05/2017     02/05/2017        Assessment/Plan:  46 y.o. gentleman with PMH of ICM (EF=15%), CAD (s/p PCI), HLD, HTN, depression and schizophrenia that presents from OSH for management of cardiogenic shock and work-up of advanced options. IABP and R IJ " CVC placed upon arrival for hemodynamic support and invasive monitoring of CVP.      Plan:   Cardiogenic shock/ ICM/ EF 10%/LVEDD 6.8 cm  - Continue IABP 1:1 Would consider starting wean tomorrow as unclear if a candidate or we are continuing eval  - Continue  5mcg/kg/mn.  - Holding Lasix with low CVP    - Will continue titration of GDMT as tolerates for afterload reduction.  - Holding B-blocker with cardiogenic shock  - Currently with no ICD  - W/u suspended yesterday d/t schizophrenia and non-compliance; need to discuss in Selection as a group     CAD s/p PCI  - Continue statin, DAPT  - Obtain reports from OSH regarding previous PCIs and when last one was put in  - Continue high dose statin     Hx of Depression / Anxiety/Schizophrenia  - Continue home mirtazapine and klonopin  - Will order Invega once wife verifies dose     Hypokalemia  -Replete K

## 2017-02-05 NOTE — PLAN OF CARE
Problem: Patient Care Overview  Goal: Plan of Care Review  Outcome: Ongoing (interventions implemented as appropriate)  Pt doing well w/ IABP, no complications. CVP remains <6-7. SVO2 69 this am. Resting very well between care. Monitoring.

## 2017-02-06 ENCOUNTER — DOCUMENTATION ONLY (OUTPATIENT)
Dept: TRANSPLANT | Facility: CLINIC | Age: 47
End: 2017-02-06

## 2017-02-06 LAB
ALBUMIN SERPL BCP-MCNC: 2.5 G/DL
ALLENS TEST: ABNORMAL
ALP SERPL-CCNC: 155 U/L
ALT SERPL W/O P-5'-P-CCNC: 425 U/L
ANION GAP SERPL CALC-SCNC: 6 MMOL/L
AST SERPL-CCNC: 204 U/L
BASOPHILS # BLD AUTO: 0.01 K/UL
BASOPHILS NFR BLD: 0.1 %
BILIRUB SERPL-MCNC: 2.1 MG/DL
BNP SERPL-MCNC: 847 PG/ML
BUN SERPL-MCNC: 27 MG/DL
CALCIUM SERPL-MCNC: 8 MG/DL
CHLORIDE SERPL-SCNC: 94 MMOL/L
CO2 SERPL-SCNC: 31 MMOL/L
CREAT SERPL-MCNC: 0.9 MG/DL
DELSYS: ABNORMAL
DIFFERENTIAL METHOD: ABNORMAL
EOSINOPHIL # BLD AUTO: 0.2 K/UL
EOSINOPHIL NFR BLD: 2 %
ERYTHROCYTE [DISTWIDTH] IN BLOOD BY AUTOMATED COUNT: 15.4 %
EST. GFR  (AFRICAN AMERICAN): >60 ML/MIN/1.73 M^2
EST. GFR  (NON AFRICAN AMERICAN): >60 ML/MIN/1.73 M^2
GLUCOSE SERPL-MCNC: 79 MG/DL
HCO3 UR-SCNC: 33.5 MMOL/L (ref 24–28)
HCT VFR BLD AUTO: 34.9 %
HGB BLD-MCNC: 11.4 G/DL
INR PPP: 1.3
LYMPHOCYTES # BLD AUTO: 2.4 K/UL
LYMPHOCYTES NFR BLD: 22.8 %
MAGNESIUM SERPL-MCNC: 1.8 MG/DL
MAGNESIUM SERPL-MCNC: 2 MG/DL
MCH RBC QN AUTO: 27.5 PG
MCHC RBC AUTO-ENTMCNC: 32.7 %
MCV RBC AUTO: 84 FL
MONOCYTES # BLD AUTO: 0.9 K/UL
MONOCYTES NFR BLD: 8.8 %
NEUTROPHILS # BLD AUTO: 6.8 K/UL
NEUTROPHILS NFR BLD: 66.2 %
PCO2 BLDA: 48.6 MMHG (ref 35–45)
PH SMN: 7.45 [PH] (ref 7.35–7.45)
PLATELET # BLD AUTO: 167 K/UL
PMV BLD AUTO: 10.1 FL
PO2 BLDA: 30 MMHG (ref 40–60)
POC BE: 9 MMOL/L
POC SATURATED O2: 59 % (ref 95–100)
POC TCO2: 35 MMOL/L (ref 24–29)
POTASSIUM SERPL-SCNC: 3.7 MMOL/L
POTASSIUM SERPL-SCNC: 4.1 MMOL/L
PROT SERPL-MCNC: 5 G/DL
PROTHROMBIN TIME: 13.8 SEC
RBC # BLD AUTO: 4.14 M/UL
SAMPLE: ABNORMAL
SITE: ABNORMAL
SODIUM SERPL-SCNC: 131 MMOL/L
WBC # BLD AUTO: 10.3 K/UL

## 2017-02-06 PROCEDURE — 36592 COLLECT BLOOD FROM PICC: CPT

## 2017-02-06 PROCEDURE — 85025 COMPLETE CBC W/AUTO DIFF WBC: CPT

## 2017-02-06 PROCEDURE — 83735 ASSAY OF MAGNESIUM: CPT

## 2017-02-06 PROCEDURE — 20000000 HC ICU ROOM

## 2017-02-06 PROCEDURE — 83735 ASSAY OF MAGNESIUM: CPT | Mod: 91

## 2017-02-06 PROCEDURE — 63600175 PHARM REV CODE 636 W HCPCS: Performed by: INTERNAL MEDICINE

## 2017-02-06 PROCEDURE — 84132 ASSAY OF SERUM POTASSIUM: CPT

## 2017-02-06 PROCEDURE — 82803 BLOOD GASES ANY COMBINATION: CPT

## 2017-02-06 PROCEDURE — 85610 PROTHROMBIN TIME: CPT

## 2017-02-06 PROCEDURE — 25000003 PHARM REV CODE 250: Performed by: NURSE PRACTITIONER

## 2017-02-06 PROCEDURE — 27200188 HC TRANSDUCER, ART ADULT/PEDS

## 2017-02-06 PROCEDURE — 25000003 PHARM REV CODE 250: Performed by: INTERNAL MEDICINE

## 2017-02-06 PROCEDURE — 63600175 PHARM REV CODE 636 W HCPCS: Performed by: NURSE PRACTITIONER

## 2017-02-06 PROCEDURE — 97110 THERAPEUTIC EXERCISES: CPT

## 2017-02-06 PROCEDURE — 80053 COMPREHEN METABOLIC PANEL: CPT

## 2017-02-06 PROCEDURE — 27000221 HC OXYGEN, UP TO 24 HOURS

## 2017-02-06 PROCEDURE — 83880 ASSAY OF NATRIURETIC PEPTIDE: CPT

## 2017-02-06 PROCEDURE — 99233 SBSQ HOSP IP/OBS HIGH 50: CPT | Mod: ,,, | Performed by: INTERNAL MEDICINE

## 2017-02-06 PROCEDURE — 97161 PT EVAL LOW COMPLEX 20 MIN: CPT

## 2017-02-06 PROCEDURE — 99900035 HC TECH TIME PER 15 MIN (STAT)

## 2017-02-06 PROCEDURE — 27000202 HC IABP, ADD'L DAY

## 2017-02-06 RX ORDER — MAGNESIUM SULFATE HEPTAHYDRATE 40 MG/ML
2 INJECTION, SOLUTION INTRAVENOUS ONCE
Status: COMPLETED | OUTPATIENT
Start: 2017-02-06 | End: 2017-02-06

## 2017-02-06 RX ORDER — FUROSEMIDE 10 MG/ML
80 INJECTION INTRAMUSCULAR; INTRAVENOUS 2 TIMES DAILY
Status: DISCONTINUED | OUTPATIENT
Start: 2017-02-06 | End: 2017-02-06

## 2017-02-06 RX ORDER — AMOXICILLIN 250 MG
2 CAPSULE ORAL 2 TIMES DAILY
Status: DISCONTINUED | OUTPATIENT
Start: 2017-02-06 | End: 2017-02-10 | Stop reason: HOSPADM

## 2017-02-06 RX ORDER — POLYETHYLENE GLYCOL 3350 17 G/17G
17 POWDER, FOR SOLUTION ORAL DAILY
Status: DISCONTINUED | OUTPATIENT
Start: 2017-02-06 | End: 2017-02-10 | Stop reason: HOSPADM

## 2017-02-06 RX ORDER — HEPARIN SODIUM 5000 [USP'U]/ML
5000 INJECTION, SOLUTION INTRAVENOUS; SUBCUTANEOUS EVERY 8 HOURS
Status: DISCONTINUED | OUTPATIENT
Start: 2017-02-06 | End: 2017-02-10 | Stop reason: HOSPADM

## 2017-02-06 RX ORDER — POTASSIUM CHLORIDE 29.8 MG/ML
40 INJECTION INTRAVENOUS ONCE
Status: COMPLETED | OUTPATIENT
Start: 2017-02-06 | End: 2017-02-06

## 2017-02-06 RX ADMIN — FUROSEMIDE 80 MG: 10 INJECTION, SOLUTION INTRAMUSCULAR; INTRAVENOUS at 09:02

## 2017-02-06 RX ADMIN — LISINOPRIL 2.5 MG: 2.5 TABLET ORAL at 09:02

## 2017-02-06 RX ADMIN — HEPARIN SODIUM 5000 UNITS: 5000 INJECTION, SOLUTION INTRAVENOUS; SUBCUTANEOUS at 09:02

## 2017-02-06 RX ADMIN — ASPIRIN 81 MG: 81 TABLET, COATED ORAL at 09:02

## 2017-02-06 RX ADMIN — CLONAZEPAM 0.5 MG: 0.5 TABLET ORAL at 09:02

## 2017-02-06 RX ADMIN — MAGNESIUM SULFATE IN WATER 2 G: 40 INJECTION, SOLUTION INTRAVENOUS at 01:02

## 2017-02-06 RX ADMIN — DOBUTAMINE IN DEXTROSE 5 MCG/KG/MIN: 200 INJECTION, SOLUTION INTRAVENOUS at 07:02

## 2017-02-06 RX ADMIN — SPIRONOLACTONE 25 MG: 25 TABLET, FILM COATED ORAL at 09:02

## 2017-02-06 RX ADMIN — POLYETHYLENE GLYCOL 3350 17 G: 17 POWDER, FOR SOLUTION ORAL at 01:02

## 2017-02-06 RX ADMIN — POTASSIUM CHLORIDE 40 MEQ: 400 INJECTION, SOLUTION INTRAVENOUS at 03:02

## 2017-02-06 RX ADMIN — STANDARDIZED SENNA CONCENTRATE AND DOCUSATE SODIUM 2 TABLET: 8.6; 5 TABLET, FILM COATED ORAL at 01:02

## 2017-02-06 RX ADMIN — OXYCODONE HYDROCHLORIDE 5 MG: 5 TABLET ORAL at 06:02

## 2017-02-06 RX ADMIN — GABAPENTIN 800 MG: 400 CAPSULE ORAL at 09:02

## 2017-02-06 RX ADMIN — HEPARIN SODIUM 5000 UNITS: 5000 INJECTION, SOLUTION INTRAVENOUS; SUBCUTANEOUS at 01:02

## 2017-02-06 RX ADMIN — STANDARDIZED SENNA CONCENTRATE AND DOCUSATE SODIUM 2 TABLET: 8.6; 5 TABLET, FILM COATED ORAL at 09:02

## 2017-02-06 RX ADMIN — CLOPIDOGREL 75 MG: 75 TABLET, FILM COATED ORAL at 09:02

## 2017-02-06 RX ADMIN — MIRTAZAPINE 45 MG: 15 TABLET, FILM COATED ORAL at 09:02

## 2017-02-06 RX ADMIN — OXYCODONE HYDROCHLORIDE 5 MG: 5 TABLET ORAL at 05:02

## 2017-02-06 NOTE — PT/OT/SLP EVAL
"Physical Therapy  Evaluation    Urbano Hensley   MRN: [id number]   Admitting Diagnosis: Cardiogenic shock    PT Received On: 02/06/17  PT Start Time: 1456     PT Stop Time: 1517    PT Total Time (min): 21 min       Billable Minutes:  Evaluation 11 and Therapeutic Exercise 10    Diagnosis: Cardiogenic shock s/p femoral IABP  OHT/LVAD work-up    Past Medical History   Diagnosis Date    Acute on chronic systolic congestive heart failure 2/4/2017    Bipolar 1 disorder     Coronary artery disease     Coronary artery disease involving native coronary artery of native heart without angina pectoris 2/4/2017    Ischemic cardiomyopathy 2/4/2017    Paranoid schizophrenia       Past Surgical History   Procedure Laterality Date    Cardiac surgery      Shoulder surgery         Referring physician: Melodie  Date referred to PT: 2/3/17    General Precautions: Standard, fall, IABP  Orthopedic Precautions: N/A   Braces: N/A       Do you have any cultural, spiritual, Yazidi conflicts, given your current situation?: none stated    Patient History:  Lives With: significant other  Living Arrangements: house  Home Accessibility: stairs to enter home  Home Layout: Able to live on 1st floor  Number of Stairs to Enter Home: 4  Stair Railings at Home: outside, present at both sides  Transportation Available: family or friend will provide  Living Environment Comment: Pt lives with significant other in a 1 story house, 4 steps to enter, B handrail. Prior to admission, pt fully Independent without DME.   Equipment Currently Used at Home: none  DME owned (not currently used): none    Previous Level of Function:  Ambulation Skills: independent  Transfer Skills: independent  ADL Skills: independent    Subjective:  Communicated with RN prior to session.  Pt willing to participate with PT. Asks "am I crippled?". Significant other present.   Chief Complaint: chronic back pain, exacerbated by immobility  Patient goals: prior level of " function    Pain Rating: 10/10   Location: back  Pain Addressed: Reposition, Distraction, Cessation of Activity       Objective:   Patient found with: blood pressure cuff, central line, yepez catheter, peripheral IV, pulse ox (continuous), telemetry     Cognitive Exam:  Oriented to: Person and Place    Follows Commands/attention: Follows one-step commands  Communication: clear/fluent  Safety awareness/insight to disability: intact    Physical Exam:  Postural examination/scapula alignment: Unable to accurately assess in supine    Skin integrity: Visible skin intact  Edema: None noted     Sensation:   Intact    Upper Extremity Range of Motion:  Right Upper Extremity: WFL  Left Upper Extremity: WFL    Upper Extremity Strength:  Right Upper Extremity: WFL  Left Upper Extremity: WFL    Lower Extremity Range of Motion:  Right Lower Extremity: Not tested due to femoral IABP  Left Lower Extremity: Not tested due to femoral IABP    Lower Extremity Strength:  Right Lower Extremity: Not tested due to femoral IABP  Left Lower Extremity: Not tested due to femoral IABP       Gross motor coordination: WFL    Functional Mobility:  Bed Mobility:  Not tested    Balance:   Not tested    Therapeutic Activities and Exercises:  B UE therex consisting of: bicep curls, tricep ext, shoulder flex, shoulder abd, shoulder ER x 10 reps with yellow TB resistance.   L LE ankle pumps x 10 reps.  Pt provided UE HEP and issued yellow TB. Pt/significant other instructed to perform 2x/day.    AM-PAC 6 CLICK MOBILITY  How much help from another person does this patient currently need?   1 = Unable, Total/Dependent Assistance  2 = A lot, Maximum/Moderate Assistance  3 = A little, Minimum/Contact Guard/Supervision  4 = None, Modified [place]/Independent    Turning over in bed (including adjusting bedclothes, sheets and blankets)?: 1  Sitting down on and standing up from a chair with arms (e.g., wheelchair, bedside commode, etc.): 1  Moving from  lying on back to sitting on the side of the bed?: 1  Moving to and from a bed to a chair (including a wheelchair)?: 1  Need to walk in hospital room?: 1  Climbing 3-5 steps with a railing?: 1  ** (Unable to perform mobility due to medical restrictions of IABP vs functional capabilities.)**  Total Score: 6     AM-PAC Raw Score CMS G-Code Modifier Level of Impairment Assistance   6 % Total / Unable   7 - 9 CM 80 - 100% Maximal Assist   10 - 14 CL 60 - 80% Moderate Assist   15 - 19 CK 40 - 60% Moderate Assist   20 - 22 CJ 20 - 40% Minimal Assist   23 CI 1-20% SBA / CGA   24 CH 0% Independent/ Mod I     Patient left supine with all lines intact, call button in reach, RN notified and significant other present.    Assessment:   Urbano Hensley is a 46 y.o. male with a medical diagnosis of Cardiogenic shock s/p femoral IABP and presents with deficits in functional mobility, activity tolerance and self care. Pt with prolonged bed immobility due to medical restrictions of femoral IABP. Pt able to participate with UE exercise program with yellow TB resistance and issued HEP. Pt would benefit from PT services to address deficits in function. Pt tx 3x/wk for UE ex while IABP in place. Will continue to progress as tolerated.    Rehab identified problem list/impairments: Rehab identified problem list/impairments: weakness, impaired endurance, impaired self care skills, impaired functional mobilty, impaired balance, decreased lower extremity function, pain, impaired cardiopulmonary response to activity    Rehab potential is good.    Activity tolerance: Fair, limited by back pain    Discharge recommendations: Discharge Facility/Level Of Care Needs:  (TBD with OOB assessment)     Barriers to discharge: Barriers to Discharge: Inaccessible home environment    Equipment recommendations: Equipment Needed After Discharge:  (TBD)     GOALS:   Physical Therapy Goals        Problem: Physical Therapy Goal    Goal Priority  Disciplines Outcome Goal Variances Interventions   Physical Therapy Goal     PT/OT, PT Ongoing (interventions implemented as appropriate)     Description:  Goals to be met by: 17     Patient will increase functional independence with mobility by performin. Supine to sit with Stand-by Assistance, once able to progress with functional mobility  2. Sit to supine with Stand-by Assistance, once able to progress with functional mobility  3. Sit to stand transfer with Stand-by Assistance, once able to progress with functional mobility  4. Bed to chair transfer with Stand-by Assistance, once able to progress with functional mobility  5. Gait  x 300 feet with Stand-by Assistance using appropriate AD, once able to progress with mobility   6. Ascend/descend 4 stair with bilateral Handrails Contact Guard Assistance, once able to progress with mobility  7. Upper/Lower extremity exercise program x15 reps per handout, with independence                PLAN:    Patient to be seen 3 x/week (until IABP discontinued) to address the above listed problems via therapeutic exercises  Plan of Care expires: 17  Plan of Care reviewed with: patient, spouse          Bhumika A Mackenzie, PT  2017

## 2017-02-06 NOTE — PLAN OF CARE
Pathway opened and halted by Dr. Sewell has additional health information to mental illness and non compliance became available

## 2017-02-06 NOTE — PLAN OF CARE
Pathway opened and halted by Dr. Sewell as additional clinical information became availabe to mental health and non compliance.

## 2017-02-06 NOTE — PLAN OF CARE
"Problem: Patient Care Overview  Goal: Plan of Care Review  Outcome: Ongoing (interventions implemented as appropriate)  Patient lethargic throughout the day until approximately 1600. Continues to c/o back discomfort and states pain med does not help. Patient's wife states he has been having this pain for "awhile and pain meds given at home do not work" . Back discomfort decreases with massage. No other c/o. Patient diuresed today with clearing of lung sounds noted post. No c/o SOB throughout the day. BP decreased post diuresing and now increasing back to am values. Continues on 1:1 IABP and Dobutrex. C/O constipation and bowel regiment started today. Wife at bedside throughout the day.      "

## 2017-02-06 NOTE — PLAN OF CARE
Problem: Physical Therapy Goal  Goal: Physical Therapy Goal  Goals to be met by: 17     Patient will increase functional independence with mobility by performin. Supine to sit with Stand-by Assistance, once able to progress with functional mobility  2. Sit to supine with Stand-by Assistance, once able to progress with functional mobility  3. Sit to stand transfer with Stand-by Assistance, once able to progress with functional mobility  4. Bed to chair transfer with Stand-by Assistance, once able to progress with functional mobility  5. Gait x 300 feet with Stand-by Assistance using appropriate AD, once able to progress with mobility   6. Ascend/descend 4 stair with bilateral Handrails Contact Guard Assistance, once able to progress with mobility  7. Upper/Lower extremity exercise program x15 reps per handout, with independence  Outcome: Ongoing (interventions implemented as appropriate)  PT eval completed. Goals initiated. Pt with L femoral IABP, issued UE exercise program. Will continue to progress as tolerated.

## 2017-02-06 NOTE — PLAN OF CARE
Problem: Patient Care Overview  Goal: Plan of Care Review  Outcome: Ongoing (interventions implemented as appropriate)  Plan of care updated and reviewed with pt and spouse. VS and assessments per flowsheet. No acute events noted throughout the night. Pt remains on IABP 1:2, EKG, Aug 80-90. All questions and concerns addressed. Will continue to monitor.

## 2017-02-06 NOTE — PROGRESS NOTES
It is recorded that Mr. Hensley was received in hospital to hospital transfer by physician order in consideration of candidacy for advanced cardiac therapy options.  It is recorded that the pathway was opened but halted by Dr. Sewell, as new clinical information became available.

## 2017-02-06 NOTE — PT/OT/SLP PROGRESS
Occupational Therapy      Urbano Hensley  MRN: [id number]    Patient not seen today secondary to Other (Comment) (Pt is appropriate for one discipline, PT will follow. ).    ERWIN Johnson  2/6/2017

## 2017-02-06 NOTE — PROGRESS NOTES
"Hospital Day: 5    Subjective:  Interval History: No c/o's overnight. Still with back pain but better. States that he is eating well.     Scheduled Meds:   aspirin  81 mg Oral Daily    clonazePAM  0.5 mg Oral BID    clopidogrel  75 mg Oral Daily    furosemide  80 mg Intravenous BID    gabapentin  800 mg Oral BID    lisinopril  2.5 mg Oral BID    mirtazapine  45 mg Oral QHS    spironolactone  25 mg Oral Daily     Continuous Infusions:   DOBUTamine 5 mcg/kg/min (02/06/17 0723)     PRN Meds:    Objective:  Vital signs in last 24 hours:   Temp:  [97.5 °F (36.4 °C)-98.5 °F (36.9 °C)] 97.7 °F (36.5 °C)  Pulse:  [78-97] 84  Resp:  [11-48] 21  SpO2:  [97 %-100 %] 99 %  BP: ()/(38-70) 79/55    Intake/Output last 3 shifts:  I/O last 3 completed shifts:  In: 1168.1 [P.O.:800; I.V.:368.1]  Out: 1540 [Urine:1540]  Intake/Output this shift:       Physical Exam:  Visit Vitals    BP (!) 79/55    Pulse 84    Temp 97.7 °F (36.5 °C) (Axillary)    Resp (!) 21    Ht 5' 11" (1.803 m)    Wt 71.9 kg (158 lb 8.2 oz)    SpO2 99%    BMI 22.11 kg/m2     General appearance: alert, appears stated age and cooperative  Neck: supple, symmetrical, trachea midline and Line in place  Lungs: diminished breath sounds bibasilar  Heart: regular rate and rhythm  Abdomen: soft, non-tender; bowel sounds normal; no masses,  no organomegaly  Extremities: extremities normal, atraumatic, no cyanosis or edema    Lab Review  Lab Results   Component Value Date     (L) 02/06/2017    K 4.1 02/06/2017    CL 94 (L) 02/06/2017    CO2 31 (H) 02/06/2017    BUN 27 (H) 02/06/2017    CREATININE 0.9 02/06/2017    CALCIUM 8.0 (L) 02/06/2017     Lab Results   Component Value Date    WBC 10.30 02/06/2017    HGB 11.4 (L) 02/06/2017    HCT 34.9 (L) 02/06/2017    MCV 84 02/06/2017     02/06/2017        Assessment/Plan:  46 y.o. gentleman with PMH of ICM (EF=15%), CAD (s/p PCI), HLD, HTN, depression and schizophrenia that presents from OSH for " management of cardiogenic shock and work-up of advanced options. IABP and R IJ CVC placed upon arrival for hemodynamic support and invasive monitoring of CVP.      Plan:   Cardiogenic shock/ ICM/ EF 10%/LVEDD 6.8 cm  - Continue IABP 1:1   - Continue  5mcg/kg/mn.  - Resume Lasix.     - Will continue titration of GDMT as tolerates for afterload reduction.  - Holding B-blocker with cardiogenic shock  - Currently with no ICD  - W/u suspended yesterday d/t schizophrenia and non-compliance; need to discuss in Selection as a group     CAD s/p PCI  - Continue statin, DAPT  - Obtain reports from OSH regarding previous PCIs and when last one was put in  - Continue high dose statin     Hx of Depression / Anxiety/Schizophrenia  - Continue home mirtazapine and klonopin  - Will order Invega once wife verifies dose(Will be due in March).      Hypokalemia  -Replete K

## 2017-02-07 PROBLEM — I42.0 COCM (CONGESTIVE CARDIOMYOPATHY): Status: ACTIVE | Noted: 2017-02-07

## 2017-02-07 LAB
ALBUMIN SERPL BCP-MCNC: 2.4 G/DL
ALLENS TEST: ABNORMAL
ALP SERPL-CCNC: 172 U/L
ALT SERPL W/O P-5'-P-CCNC: 331 U/L
ANION GAP SERPL CALC-SCNC: 8 MMOL/L
AST SERPL-CCNC: 140 U/L
BASOPHILS # BLD AUTO: 0.03 K/UL
BASOPHILS NFR BLD: 0.3 %
BILIRUB SERPL-MCNC: 1.9 MG/DL
BUN SERPL-MCNC: 27 MG/DL
CALCIUM SERPL-MCNC: 8.2 MG/DL
CHLORIDE SERPL-SCNC: 99 MMOL/L
CO2 SERPL-SCNC: 29 MMOL/L
CREAT SERPL-MCNC: 0.9 MG/DL
DELSYS: ABNORMAL
DIFFERENTIAL METHOD: ABNORMAL
EOSINOPHIL # BLD AUTO: 0.3 K/UL
EOSINOPHIL NFR BLD: 3.1 %
ERYTHROCYTE [DISTWIDTH] IN BLOOD BY AUTOMATED COUNT: 15.6 %
EST. GFR  (AFRICAN AMERICAN): >60 ML/MIN/1.73 M^2
EST. GFR  (NON AFRICAN AMERICAN): >60 ML/MIN/1.73 M^2
FLOW: 2
GLUCOSE SERPL-MCNC: 93 MG/DL
HCO3 UR-SCNC: 30.2 MMOL/L (ref 24–28)
HCO3 UR-SCNC: 32.7 MMOL/L (ref 24–28)
HCO3 UR-SCNC: 33.4 MMOL/L (ref 24–28)
HCT VFR BLD AUTO: 35.8 %
HGB BLD-MCNC: 11.7 G/DL
INR PPP: 1.2
LYMPHOCYTES # BLD AUTO: 2.3 K/UL
LYMPHOCYTES NFR BLD: 23 %
MAGNESIUM SERPL-MCNC: 2.2 MG/DL
MCH RBC QN AUTO: 27.6 PG
MCHC RBC AUTO-ENTMCNC: 32.7 %
MCV RBC AUTO: 84 FL
MODE: ABNORMAL
MONOCYTES # BLD AUTO: 1.2 K/UL
MONOCYTES NFR BLD: 11.7 %
NEUTROPHILS # BLD AUTO: 6.3 K/UL
NEUTROPHILS NFR BLD: 61.6 %
PCO2 BLDA: 44 MMHG (ref 35–45)
PCO2 BLDA: 45.5 MMHG (ref 35–45)
PCO2 BLDA: 50.9 MMHG (ref 35–45)
PH SMN: 7.42 [PH] (ref 7.35–7.45)
PH SMN: 7.45 [PH] (ref 7.35–7.45)
PH SMN: 7.47 [PH] (ref 7.35–7.45)
PLATELET # BLD AUTO: 183 K/UL
PMV BLD AUTO: 9.9 FL
PO2 BLDA: 33 MMHG (ref 40–60)
PO2 BLDA: 36 MMHG (ref 40–60)
PO2 BLDA: 37 MMHG (ref 40–60)
POC BE: 10 MMOL/L
POC BE: 6 MMOL/L
POC BE: 8 MMOL/L
POC SATURATED O2: 65 % (ref 95–100)
POC SATURATED O2: 70 % (ref 95–100)
POC SATURATED O2: 73 % (ref 95–100)
POC TCO2: 32 MMOL/L (ref 24–29)
POC TCO2: 34 MMOL/L (ref 24–29)
POC TCO2: 35 MMOL/L (ref 24–29)
POTASSIUM SERPL-SCNC: 4.3 MMOL/L
PROT SERPL-MCNC: 5.1 G/DL
PROTHROMBIN TIME: 12.4 SEC
RBC # BLD AUTO: 4.24 M/UL
SAMPLE: ABNORMAL
SITE: ABNORMAL
SODIUM SERPL-SCNC: 136 MMOL/L
SP02: 100
WBC # BLD AUTO: 10.19 K/UL

## 2017-02-07 PROCEDURE — 80053 COMPREHEN METABOLIC PANEL: CPT

## 2017-02-07 PROCEDURE — 83735 ASSAY OF MAGNESIUM: CPT

## 2017-02-07 PROCEDURE — 63600175 PHARM REV CODE 636 W HCPCS: Performed by: INTERNAL MEDICINE

## 2017-02-07 PROCEDURE — 85025 COMPLETE CBC W/AUTO DIFF WBC: CPT

## 2017-02-07 PROCEDURE — 99900035 HC TECH TIME PER 15 MIN (STAT)

## 2017-02-07 PROCEDURE — 25000003 PHARM REV CODE 250: Performed by: INTERNAL MEDICINE

## 2017-02-07 PROCEDURE — 25000003 PHARM REV CODE 250: Performed by: NURSE PRACTITIONER

## 2017-02-07 PROCEDURE — 20000000 HC ICU ROOM

## 2017-02-07 PROCEDURE — 99233 SBSQ HOSP IP/OBS HIGH 50: CPT | Mod: ,,, | Performed by: INTERNAL MEDICINE

## 2017-02-07 PROCEDURE — 27000202 HC IABP, ADD'L DAY

## 2017-02-07 PROCEDURE — 82803 BLOOD GASES ANY COMBINATION: CPT

## 2017-02-07 PROCEDURE — 85610 PROTHROMBIN TIME: CPT

## 2017-02-07 RX ORDER — FUROSEMIDE 80 MG/1
80 TABLET ORAL DAILY
Status: DISCONTINUED | OUTPATIENT
Start: 2017-02-07 | End: 2017-02-10 | Stop reason: HOSPADM

## 2017-02-07 RX ORDER — NICOTINE 7MG/24HR
1 PATCH, TRANSDERMAL 24 HOURS TRANSDERMAL DAILY
Status: DISCONTINUED | OUTPATIENT
Start: 2017-02-08 | End: 2017-02-10 | Stop reason: HOSPADM

## 2017-02-07 RX ADMIN — FUROSEMIDE 80 MG: 80 TABLET ORAL at 09:02

## 2017-02-07 RX ADMIN — STANDARDIZED SENNA CONCENTRATE AND DOCUSATE SODIUM 2 TABLET: 8.6; 5 TABLET, FILM COATED ORAL at 09:02

## 2017-02-07 RX ADMIN — OXYCODONE HYDROCHLORIDE 5 MG: 5 TABLET ORAL at 09:02

## 2017-02-07 RX ADMIN — CLONAZEPAM 0.5 MG: 0.5 TABLET ORAL at 09:02

## 2017-02-07 RX ADMIN — CLOPIDOGREL 75 MG: 75 TABLET, FILM COATED ORAL at 09:02

## 2017-02-07 RX ADMIN — LISINOPRIL 2.5 MG: 2.5 TABLET ORAL at 09:02

## 2017-02-07 RX ADMIN — DOBUTAMINE IN DEXTROSE 5 MCG/KG/MIN: 200 INJECTION, SOLUTION INTRAVENOUS at 07:02

## 2017-02-07 RX ADMIN — POLYETHYLENE GLYCOL 3350 17 G: 17 POWDER, FOR SOLUTION ORAL at 09:02

## 2017-02-07 RX ADMIN — OXYCODONE HYDROCHLORIDE 5 MG: 5 TABLET ORAL at 07:02

## 2017-02-07 RX ADMIN — HEPARIN SODIUM 5000 UNITS: 5000 INJECTION, SOLUTION INTRAVENOUS; SUBCUTANEOUS at 06:02

## 2017-02-07 RX ADMIN — ASPIRIN 81 MG: 81 TABLET, COATED ORAL at 09:02

## 2017-02-07 RX ADMIN — HEPARIN SODIUM 5000 UNITS: 5000 INJECTION, SOLUTION INTRAVENOUS; SUBCUTANEOUS at 09:02

## 2017-02-07 RX ADMIN — GABAPENTIN 800 MG: 400 CAPSULE ORAL at 09:02

## 2017-02-07 RX ADMIN — SPIRONOLACTONE 25 MG: 25 TABLET, FILM COATED ORAL at 09:02

## 2017-02-07 NOTE — PLAN OF CARE
Problem: Patient Care Overview  Goal: Plan of Care Review  Outcome: Ongoing (interventions implemented as appropriate)  Pt VSS at this time, pt's IABP 1:2 EKG AUTO augmenting in the 80s, pt was weaned today to 1:3, SvO2 73, NP Sherri to call interventional to come pull IABP at bedside, pt on 1:2 until that time, will continue to monitor patient closely, see flow sheet for full assessment, POC discussed with patient and wife at bedside.

## 2017-02-07 NOTE — PROGRESS NOTES
"Hospital Day: 6    Subjective:  Interval History: No c/o's overnight. Still with no BM.     Scheduled Meds:   aspirin  81 mg Oral Daily    clonazePAM  0.5 mg Oral BID    clopidogrel  75 mg Oral Daily    gabapentin  800 mg Oral BID    heparin (porcine)  5,000 Units Subcutaneous Q8H    lisinopril  2.5 mg Oral BID    mirtazapine  45 mg Oral QHS    polyethylene glycol  17 g Oral Daily    senna-docusate 8.6-50 mg  2 tablet Oral BID    spironolactone  25 mg Oral Daily     Continuous Infusions:   DOBUTamine 5 mcg/kg/min (02/07/17 0745)     PRN Meds:    Objective:  Vital signs in last 24 hours:   Temp:  [97.6 °F (36.4 °C)-98.3 °F (36.8 °C)] 97.8 °F (36.6 °C)  Pulse:  [77-96] 88  Resp:  [9-22] 15  SpO2:  [83 %-100 %] 100 %  BP: ()/(42-73) 94/67    Intake/Output last 3 shifts:  I/O last 3 completed shifts:  In: 1161.6 [P.O.:720; I.V.:341.6; IV Piggyback:100]  Out: 4105 [Urine:4105]  Intake/Output this shift:  I/O this shift:  In: 140.6 [I.V.:140.6]  Out: 60 [Urine:60]    Physical Exam:  Visit Vitals    BP 94/67    Pulse 88    Temp 97.8 °F (36.6 °C) (Axillary)    Resp 15    Ht 5' 11" (1.803 m)    Wt 71.9 kg (158 lb 8.2 oz)    SpO2 100%    BMI 22.11 kg/m2     General appearance: alert, appears stated age and cooperative  Neck: supple, symmetrical, trachea midline and Line in place  Lungs: diminished breath sounds bibasilar  Heart: regular rate and rhythm  Abdomen: soft, non-tender; bowel sounds normal; no masses,  no organomegaly  Extremities: extremities normal, atraumatic, no cyanosis or edema    Lab Review  Lab Results   Component Value Date     02/07/2017    K 4.3 02/07/2017    CL 99 02/07/2017    CO2 29 02/07/2017    BUN 27 (H) 02/07/2017    CREATININE 0.9 02/07/2017    CALCIUM 8.2 (L) 02/07/2017     Lab Results   Component Value Date    WBC 10.19 02/07/2017    HGB 11.7 (L) 02/07/2017    HCT 35.8 (L) 02/07/2017    MCV 84 02/07/2017     02/07/2017        Assessment/Plan:  46 y.o. " gentleman with PMH of ICM (EF=15%), CAD (s/p PCI), HLD, HTN, depression and schizophrenia that presents from OSH for management of cardiogenic shock and work-up of advanced options. IABP and R IJ CVC placed upon arrival for hemodynamic support and invasive monitoring of CVP.      Plan:   Cardiogenic shock/ ICM/ EF 10%/LVEDD 6.8 cm  - Will wean IABP to 1:2.  - Continue  5mcg/kg/mn.  - Resume PO Lasix.     - Will continue titration of GDMT as tolerates for afterload reduction.  - Holding B-blocker with cardiogenic shock  - Currently with no ICD  - W/u suspended yesterday d/t schizophrenia and non-compliance.     CAD s/p PCI  - Continue statin, DAPT  - Obtain reports from OSH regarding previous PCIs and when last one was put in  - Continue high dose statin     Hx of Depression / Anxiety/Schizophrenia  - Continue home mirtazapine and klonopin  - Will order Invega once wife verifies dose(Will be due in March).      Hypokalemia  -Replete K

## 2017-02-07 NOTE — PLAN OF CARE
Problem: Patient Care Overview  Goal: Plan of Care Review  Outcome: Ongoing (interventions implemented as appropriate)  Plan of care updated and reviewed with pt. VS and assessments per flowsheet. No acute events noted throughout the night. Pt remains on IABP 1:1 EKG Aug 70s-80s. All questions and concerns addressed. Will continue to monitor.

## 2017-02-08 LAB
ALBUMIN SERPL BCP-MCNC: 2.4 G/DL
ALLENS TEST: ABNORMAL
ALP SERPL-CCNC: 174 U/L
ALT SERPL W/O P-5'-P-CCNC: 263 U/L
ANION GAP SERPL CALC-SCNC: 5 MMOL/L
AST SERPL-CCNC: 94 U/L
BASOPHILS # BLD AUTO: 0.04 K/UL
BASOPHILS NFR BLD: 0.3 %
BILIRUB SERPL-MCNC: 2 MG/DL
BUN SERPL-MCNC: 24 MG/DL
CALCIUM SERPL-MCNC: 8.1 MG/DL
CHLORIDE SERPL-SCNC: 103 MMOL/L
CO2 SERPL-SCNC: 27 MMOL/L
CREAT SERPL-MCNC: 1 MG/DL
DIFFERENTIAL METHOD: ABNORMAL
EOSINOPHIL # BLD AUTO: 0.4 K/UL
EOSINOPHIL NFR BLD: 2.8 %
ERYTHROCYTE [DISTWIDTH] IN BLOOD BY AUTOMATED COUNT: 15.7 %
EST. GFR  (AFRICAN AMERICAN): >60 ML/MIN/1.73 M^2
EST. GFR  (NON AFRICAN AMERICAN): >60 ML/MIN/1.73 M^2
GLUCOSE SERPL-MCNC: 96 MG/DL
HCO3 UR-SCNC: 25.7 MMOL/L (ref 24–28)
HCT VFR BLD AUTO: 37.6 %
HGB BLD-MCNC: 12.3 G/DL
INR PPP: 1.2
LYMPHOCYTES # BLD AUTO: 2.3 K/UL
LYMPHOCYTES NFR BLD: 17.9 %
MAGNESIUM SERPL-MCNC: 1.8 MG/DL
MCH RBC QN AUTO: 27.7 PG
MCHC RBC AUTO-ENTMCNC: 32.7 %
MCV RBC AUTO: 85 FL
MONOCYTES # BLD AUTO: 1.2 K/UL
MONOCYTES NFR BLD: 9.1 %
NEUTROPHILS # BLD AUTO: 8.8 K/UL
NEUTROPHILS NFR BLD: 69.7 %
PCO2 BLDA: 40.3 MMHG (ref 35–45)
PH SMN: 7.41 [PH] (ref 7.35–7.45)
PLATELET # BLD AUTO: 201 K/UL
PMV BLD AUTO: 10.1 FL
PO2 BLDA: 32 MMHG (ref 40–60)
POC BE: 1 MMOL/L
POC SATURATED O2: 63 % (ref 95–100)
POC TCO2: 27 MMOL/L (ref 24–29)
POTASSIUM SERPL-SCNC: 4.5 MMOL/L
PROT SERPL-MCNC: 5.2 G/DL
PROTHROMBIN TIME: 12.8 SEC
RBC # BLD AUTO: 4.44 M/UL
SAMPLE: ABNORMAL
SITE: ABNORMAL
SODIUM SERPL-SCNC: 135 MMOL/L
WBC # BLD AUTO: 12.66 K/UL

## 2017-02-08 PROCEDURE — 80053 COMPREHEN METABOLIC PANEL: CPT

## 2017-02-08 PROCEDURE — 99233 SBSQ HOSP IP/OBS HIGH 50: CPT | Mod: ,,, | Performed by: INTERNAL MEDICINE

## 2017-02-08 PROCEDURE — 85610 PROTHROMBIN TIME: CPT

## 2017-02-08 PROCEDURE — 25000003 PHARM REV CODE 250: Performed by: INTERNAL MEDICINE

## 2017-02-08 PROCEDURE — 85025 COMPLETE CBC W/AUTO DIFF WBC: CPT

## 2017-02-08 PROCEDURE — 25000003 PHARM REV CODE 250: Performed by: NURSE PRACTITIONER

## 2017-02-08 PROCEDURE — 83735 ASSAY OF MAGNESIUM: CPT

## 2017-02-08 PROCEDURE — 20000000 HC ICU ROOM

## 2017-02-08 PROCEDURE — 82803 BLOOD GASES ANY COMBINATION: CPT

## 2017-02-08 PROCEDURE — 99900035 HC TECH TIME PER 15 MIN (STAT)

## 2017-02-08 PROCEDURE — 27000202 HC IABP, ADD'L DAY

## 2017-02-08 PROCEDURE — 63600175 PHARM REV CODE 636 W HCPCS: Performed by: INTERNAL MEDICINE

## 2017-02-08 PROCEDURE — 63600175 PHARM REV CODE 636 W HCPCS: Performed by: STUDENT IN AN ORGANIZED HEALTH CARE EDUCATION/TRAINING PROGRAM

## 2017-02-08 PROCEDURE — 25000003 PHARM REV CODE 250: Performed by: STUDENT IN AN ORGANIZED HEALTH CARE EDUCATION/TRAINING PROGRAM

## 2017-02-08 RX ORDER — MAGNESIUM SULFATE HEPTAHYDRATE 40 MG/ML
2 INJECTION, SOLUTION INTRAVENOUS ONCE
Status: COMPLETED | OUTPATIENT
Start: 2017-02-08 | End: 2017-02-08

## 2017-02-08 RX ADMIN — CLONAZEPAM 0.5 MG: 0.5 TABLET ORAL at 09:02

## 2017-02-08 RX ADMIN — LISINOPRIL 2.5 MG: 2.5 TABLET ORAL at 09:02

## 2017-02-08 RX ADMIN — OXYCODONE HYDROCHLORIDE 5 MG: 5 TABLET ORAL at 06:02

## 2017-02-08 RX ADMIN — LISINOPRIL 2.5 MG: 2.5 TABLET ORAL at 08:02

## 2017-02-08 RX ADMIN — STANDARDIZED SENNA CONCENTRATE AND DOCUSATE SODIUM 2 TABLET: 8.6; 5 TABLET, FILM COATED ORAL at 09:02

## 2017-02-08 RX ADMIN — SPIRONOLACTONE 25 MG: 25 TABLET, FILM COATED ORAL at 08:02

## 2017-02-08 RX ADMIN — OXYCODONE HYDROCHLORIDE 5 MG: 5 TABLET ORAL at 09:02

## 2017-02-08 RX ADMIN — GABAPENTIN 800 MG: 400 CAPSULE ORAL at 09:02

## 2017-02-08 RX ADMIN — NICOTINE 1 PATCH: 7 PATCH, EXTENDED RELEASE TRANSDERMAL at 08:02

## 2017-02-08 RX ADMIN — MAGNESIUM SULFATE IN WATER 2 G: 40 INJECTION, SOLUTION INTRAVENOUS at 04:02

## 2017-02-08 RX ADMIN — POLYETHYLENE GLYCOL 3350 17 G: 17 POWDER, FOR SOLUTION ORAL at 08:02

## 2017-02-08 RX ADMIN — OXYCODONE HYDROCHLORIDE 5 MG: 5 TABLET ORAL at 11:02

## 2017-02-08 RX ADMIN — GABAPENTIN 800 MG: 400 CAPSULE ORAL at 08:02

## 2017-02-08 RX ADMIN — DOBUTAMINE IN DEXTROSE 5 MCG/KG/MIN: 200 INJECTION, SOLUTION INTRAVENOUS at 03:02

## 2017-02-08 RX ADMIN — CYCLOBENZAPRINE HYDROCHLORIDE 5 MG: 5 TABLET, FILM COATED ORAL at 07:02

## 2017-02-08 RX ADMIN — CLOPIDOGREL 75 MG: 75 TABLET, FILM COATED ORAL at 08:02

## 2017-02-08 RX ADMIN — STANDARDIZED SENNA CONCENTRATE AND DOCUSATE SODIUM 2 TABLET: 8.6; 5 TABLET, FILM COATED ORAL at 08:02

## 2017-02-08 RX ADMIN — FUROSEMIDE 80 MG: 80 TABLET ORAL at 08:02

## 2017-02-08 RX ADMIN — CLONAZEPAM 0.5 MG: 0.5 TABLET ORAL at 08:02

## 2017-02-08 RX ADMIN — ASPIRIN 81 MG: 81 TABLET, COATED ORAL at 08:02

## 2017-02-08 RX ADMIN — HEPARIN SODIUM 5000 UNITS: 5000 INJECTION, SOLUTION INTRAVENOUS; SUBCUTANEOUS at 09:02

## 2017-02-08 RX ADMIN — HEPARIN SODIUM 5000 UNITS: 5000 INJECTION, SOLUTION INTRAVENOUS; SUBCUTANEOUS at 06:02

## 2017-02-08 RX ADMIN — OXYCODONE HYDROCHLORIDE 5 MG: 5 TABLET ORAL at 05:02

## 2017-02-08 NOTE — PROGRESS NOTES
Contacted MD Joseph regarding pt's most recent Magnesium level of 1.8. Ordered to give 2 grams of Mag IVPB. Orders to be carried out. VSS/ WCTM.

## 2017-02-08 NOTE — PROGRESS NOTES
"Hospital Day: 7    Subjective:  Interval History: No c/o's overnight. Still with back pain.     Scheduled Meds:   aspirin  81 mg Oral Daily    clonazePAM  0.5 mg Oral BID    clopidogrel  75 mg Oral Daily    furosemide  80 mg Oral Daily    gabapentin  800 mg Oral BID    heparin (porcine)  5,000 Units Subcutaneous Q8H    lisinopril  2.5 mg Oral BID    mirtazapine  45 mg Oral QHS    nicotine  1 patch Transdermal Daily    polyethylene glycol  17 g Oral Daily    senna-docusate 8.6-50 mg  2 tablet Oral BID    spironolactone  25 mg Oral Daily     Continuous Infusions:   DOBUTamine 5 mcg/kg/min (02/08/17 0701)     PRN Meds:    Objective:  Vital signs in last 24 hours:   Temp:  [97.7 °F (36.5 °C)-98.3 °F (36.8 °C)] 97.9 °F (36.6 °C)  Pulse:  [] 94  Resp:  [14-26] 17  SpO2:  [97 %-100 %] 100 %  BP: ()/(50-64) 112/53    Intake/Output last 3 shifts:  I/O last 3 completed shifts:  In: 1323.8 [P.O.:760; I.V.:563.8]  Out: 2665 [Urine:2665]  Intake/Output this shift:  I/O this shift:  In: 15.8 [I.V.:15.8]  Out: 85 [Urine:85]    Physical Exam:  Visit Vitals    BP (!) 112/53    Pulse 94    Temp 97.9 °F (36.6 °C) (Axillary)    Resp 17    Ht 5' 11" (1.803 m)    Wt 71.9 kg (158 lb 8.2 oz)    SpO2 100%    BMI 22.11 kg/m2     General appearance: alert, appears stated age and cooperative  Neck: supple, symmetrical, trachea midline and Line in place  Lungs: diminished breath sounds bibasilar  Heart: regular rate and rhythm  Abdomen: soft, non-tender; bowel sounds normal; no masses,  no organomegaly  Extremities: extremities normal, atraumatic, no cyanosis or edema    Lab Review  Lab Results   Component Value Date     (L) 02/08/2017    K 4.5 02/08/2017     02/08/2017    CO2 27 02/08/2017    BUN 24 (H) 02/08/2017    CREATININE 1.0 02/08/2017    CALCIUM 8.1 (L) 02/08/2017     Lab Results   Component Value Date    WBC 12.66 02/08/2017    HGB 12.3 (L) 02/08/2017    HCT 37.6 (L) 02/08/2017    MCV 85 " 02/08/2017     02/08/2017        Assessment/Plan:  46 y.o. gentleman with PMH of ICM (EF=15%), CAD (s/p PCI), HLD, HTN, depression and schizophrenia that presents from OSH for management of cardiogenic shock and work-up of advanced options. IABP and R IJ CVC placed upon arrival for hemodynamic support and invasive monitoring of CVP.      Plan:   Cardiogenic shock/ ICM/ EF 10%/LVEDD 6.8 cm  -  IABP 1:2. Plan for removal today.   - Continue  5mcg/kg/mn.  - Continue PO Lasix.     - Will continue titration of GDMT as tolerates for afterload reduction.  - Holding B-blocker with cardiogenic shock  - Currently with no ICD  - W/u suspended yesterday d/t schizophrenia and non-compliance. Plan to send pt home with  at this point.      CAD s/p PCI  - Continue statin, DAPT  - Continue high dose statin     Hx of Depression / Anxiety/Schizophrenia  - Continue home mirtazapine and klonopin  - Will order Invega once wife verifies dose(Will be due in March).      Hypokalemia  -Replete K

## 2017-02-08 NOTE — PLAN OF CARE
Problem: Patient Care Overview  Goal: Plan of Care Review  Outcome: Ongoing (interventions implemented as appropriate)  No acute events throughout night. O2 status remains stable on RA with clear lung fields throughout. NSR overnight. IABP support continued: ECG 1:2, Auto. Diastolic augmentation averaging 70-80. Site remains clean, dry, and intact with no blood present in tubing. Denies SOB/ chest pain overnight. Dobutamine gtt continued. CVPs averaging 1-4.  Fluids restrictions of 1500 ml continued. Adequate, yellow U/O via yepez catheter. PRN oxycodone administered for back pain. Refused nightly dose of Remeron. Plan to remove IABP today. VS and assessment per flow sheet, patient progressing towards goals as tolerated, plan of care reviewed with Urbano Hensley and significant other, all concerns addressed, will continue to monitor.

## 2017-02-08 NOTE — PLAN OF CARE
Problem: Patient Care Overview  Goal: Plan of Care Review  Outcome: Ongoing (interventions implemented as appropriate)  VSS at this time, pt still had IABP in place in the R groin, plan to have that taken out this evening, see flow sheet for full assessment, will continue to monitor patient closely.

## 2017-02-08 NOTE — PROGRESS NOTES
"UPDATE    SW to pt's room for update.  Pt presents as lying in bed, sleeping.  Pt's significant other Gregoria at bedside.  Sig. other presents as aao x4, calm, cooperative, and asking and answering questions appropriately.  Sig. other reports she and pt are both coping adequately at this time.  Sig. other reports plan is to remove balloon pump today and "see how he does."  Sig. other reports they are looking forward to moving to a floor room and for pt to be able to get up and move around more.  Sig. other does not identify any needs or concerns at this time.  SW providing ongoing psychosocial and counseling support, education, resources, assistance, and discharge planning as indicated.  SW following and remains available.  "

## 2017-02-08 NOTE — PROGRESS NOTES
DO Joseph with [facility] notified pt does not have code status on file. MD states he will enter code status shortly.

## 2017-02-09 LAB
ALBUMIN SERPL BCP-MCNC: 2.5 G/DL
ALLENS TEST: ABNORMAL
ALP SERPL-CCNC: 189 U/L
ALT SERPL W/O P-5'-P-CCNC: 218 U/L
ANION GAP SERPL CALC-SCNC: 5 MMOL/L
AST SERPL-CCNC: 79 U/L
BASOPHILS # BLD AUTO: 0.04 K/UL
BASOPHILS NFR BLD: 0.3 %
BILIRUB SERPL-MCNC: 2.1 MG/DL
BUN SERPL-MCNC: 21 MG/DL
CALCIUM SERPL-MCNC: 8.1 MG/DL
CHLORIDE SERPL-SCNC: 101 MMOL/L
CO2 SERPL-SCNC: 27 MMOL/L
CREAT SERPL-MCNC: 1 MG/DL
DELSYS: ABNORMAL
DIFFERENTIAL METHOD: ABNORMAL
EOSINOPHIL # BLD AUTO: 0.4 K/UL
EOSINOPHIL NFR BLD: 3.5 %
ERYTHROCYTE [DISTWIDTH] IN BLOOD BY AUTOMATED COUNT: 15.5 %
EST. GFR  (AFRICAN AMERICAN): >60 ML/MIN/1.73 M^2
EST. GFR  (NON AFRICAN AMERICAN): >60 ML/MIN/1.73 M^2
GLUCOSE SERPL-MCNC: 97 MG/DL
HCO3 UR-SCNC: 23.3 MMOL/L (ref 24–28)
HCT VFR BLD AUTO: 35.9 %
HGB BLD-MCNC: 12 G/DL
INR PPP: 1.3
LYMPHOCYTES # BLD AUTO: 1.8 K/UL
LYMPHOCYTES NFR BLD: 15.4 %
MAGNESIUM SERPL-MCNC: 2 MG/DL
MCH RBC QN AUTO: 27.8 PG
MCHC RBC AUTO-ENTMCNC: 33.4 %
MCV RBC AUTO: 83 FL
MODE: ABNORMAL
MONOCYTES # BLD AUTO: 1.7 K/UL
MONOCYTES NFR BLD: 14.5 %
NEUTROPHILS # BLD AUTO: 7.7 K/UL
NEUTROPHILS NFR BLD: 66 %
PCO2 BLDA: 37.3 MMHG (ref 35–45)
PH SMN: 7.41 [PH] (ref 7.35–7.45)
PLATELET # BLD AUTO: 218 K/UL
PMV BLD AUTO: 9.6 FL
PO2 BLDA: 36 MMHG (ref 40–60)
POC BE: -1 MMOL/L
POC SATURATED O2: 69 % (ref 95–100)
POC TCO2: 24 MMOL/L (ref 24–29)
POTASSIUM SERPL-SCNC: 4.3 MMOL/L
PROT SERPL-MCNC: 5.5 G/DL
PROTHROMBIN TIME: 13 SEC
RBC # BLD AUTO: 4.32 M/UL
SAMPLE: ABNORMAL
SITE: ABNORMAL
SODIUM SERPL-SCNC: 133 MMOL/L
WBC # BLD AUTO: 11.62 K/UL

## 2017-02-09 PROCEDURE — 25000003 PHARM REV CODE 250: Performed by: INTERNAL MEDICINE

## 2017-02-09 PROCEDURE — 25000003 PHARM REV CODE 250: Performed by: STUDENT IN AN ORGANIZED HEALTH CARE EDUCATION/TRAINING PROGRAM

## 2017-02-09 PROCEDURE — 25000003 PHARM REV CODE 250: Performed by: NURSE PRACTITIONER

## 2017-02-09 PROCEDURE — 80053 COMPREHEN METABOLIC PANEL: CPT

## 2017-02-09 PROCEDURE — 83735 ASSAY OF MAGNESIUM: CPT

## 2017-02-09 PROCEDURE — 85025 COMPLETE CBC W/AUTO DIFF WBC: CPT

## 2017-02-09 PROCEDURE — C1751 CATH, INF, PER/CENT/MIDLINE: HCPCS

## 2017-02-09 PROCEDURE — 63600175 PHARM REV CODE 636 W HCPCS: Performed by: INTERNAL MEDICINE

## 2017-02-09 PROCEDURE — 02HV33Z INSERTION OF INFUSION DEVICE INTO SUPERIOR VENA CAVA, PERCUTANEOUS APPROACH: ICD-10-PCS | Performed by: INTERNAL MEDICINE

## 2017-02-09 PROCEDURE — 20600001 HC STEP DOWN PRIVATE ROOM

## 2017-02-09 PROCEDURE — 85610 PROTHROMBIN TIME: CPT

## 2017-02-09 PROCEDURE — 36569 INSJ PICC 5 YR+ W/O IMAGING: CPT

## 2017-02-09 PROCEDURE — 76937 US GUIDE VASCULAR ACCESS: CPT

## 2017-02-09 PROCEDURE — 99223 1ST HOSP IP/OBS HIGH 75: CPT | Mod: ,,, | Performed by: INTERNAL MEDICINE

## 2017-02-09 PROCEDURE — 63600175 PHARM REV CODE 636 W HCPCS: Performed by: NURSE PRACTITIONER

## 2017-02-09 RX ORDER — SODIUM CHLORIDE 0.9 % (FLUSH) 0.9 %
10 SYRINGE (ML) INJECTION EVERY 6 HOURS
Status: DISCONTINUED | OUTPATIENT
Start: 2017-02-09 | End: 2017-02-10 | Stop reason: HOSPADM

## 2017-02-09 RX ORDER — SODIUM CHLORIDE 0.9 % (FLUSH) 0.9 %
10 SYRINGE (ML) INJECTION
Status: DISCONTINUED | OUTPATIENT
Start: 2017-02-09 | End: 2017-02-10 | Stop reason: HOSPADM

## 2017-02-09 RX ADMIN — OXYCODONE HYDROCHLORIDE 5 MG: 5 TABLET ORAL at 11:02

## 2017-02-09 RX ADMIN — STANDARDIZED SENNA CONCENTRATE AND DOCUSATE SODIUM 2 TABLET: 8.6; 5 TABLET, FILM COATED ORAL at 09:02

## 2017-02-09 RX ADMIN — GABAPENTIN 800 MG: 400 CAPSULE ORAL at 09:02

## 2017-02-09 RX ADMIN — ASPIRIN 81 MG: 81 TABLET, COATED ORAL at 09:02

## 2017-02-09 RX ADMIN — POLYETHYLENE GLYCOL 3350 17 G: 17 POWDER, FOR SOLUTION ORAL at 09:02

## 2017-02-09 RX ADMIN — CLOPIDOGREL 75 MG: 75 TABLET, FILM COATED ORAL at 09:02

## 2017-02-09 RX ADMIN — HEPARIN SODIUM 5000 UNITS: 5000 INJECTION, SOLUTION INTRAVENOUS; SUBCUTANEOUS at 09:02

## 2017-02-09 RX ADMIN — NICOTINE 1 PATCH: 7 PATCH, EXTENDED RELEASE TRANSDERMAL at 09:02

## 2017-02-09 RX ADMIN — CLONAZEPAM 0.5 MG: 0.5 TABLET ORAL at 09:02

## 2017-02-09 RX ADMIN — SPIRONOLACTONE 25 MG: 25 TABLET, FILM COATED ORAL at 09:02

## 2017-02-09 RX ADMIN — OXYCODONE HYDROCHLORIDE 5 MG: 5 TABLET ORAL at 07:02

## 2017-02-09 RX ADMIN — HEPARIN SODIUM 5000 UNITS: 5000 INJECTION, SOLUTION INTRAVENOUS; SUBCUTANEOUS at 02:02

## 2017-02-09 RX ADMIN — SODIUM CHLORIDE, PRESERVATIVE FREE 10 ML: 5 INJECTION INTRAVENOUS at 06:02

## 2017-02-09 RX ADMIN — LISINOPRIL 2.5 MG: 2.5 TABLET ORAL at 09:02

## 2017-02-09 RX ADMIN — OXYCODONE HYDROCHLORIDE 5 MG: 5 TABLET ORAL at 03:02

## 2017-02-09 RX ADMIN — HEPARIN SODIUM 5000 UNITS: 5000 INJECTION, SOLUTION INTRAVENOUS; SUBCUTANEOUS at 06:02

## 2017-02-09 RX ADMIN — OXYCODONE HYDROCHLORIDE 5 MG: 5 TABLET ORAL at 06:02

## 2017-02-09 RX ADMIN — FUROSEMIDE 80 MG: 80 TABLET ORAL at 09:02

## 2017-02-09 RX ADMIN — MORPHINE SULFATE 2 MG: 2 INJECTION, SOLUTION INTRAMUSCULAR; INTRAVENOUS at 09:02

## 2017-02-09 RX ADMIN — DOBUTAMINE IN DEXTROSE 5 MCG/KG/MIN: 200 INJECTION, SOLUTION INTRAVENOUS at 02:02

## 2017-02-09 RX ADMIN — CYCLOBENZAPRINE HYDROCHLORIDE 5 MG: 5 TABLET, FILM COATED ORAL at 02:02

## 2017-02-09 NOTE — CONSULTS
Double lumen PICC placed in right brachial vein, 36cm in length with 0cm exposed. Arm circumference 25cm.  LOT#[id number]

## 2017-02-09 NOTE — PROCEDURES
"Urbano Hensley is a 46 y.o. male patient.    Temp: 98.1 °F (36.7 °C) (02/09/17 1503)  Pulse: 82 (02/09/17 1503)  Resp: 20 (02/09/17 1503)  BP: (!) 88/64 (02/09/17 1503)  SpO2: 99 % (02/09/17 1503)  Weight: 71.9 kg (158 lb 8.2 oz) (02/02/17 2134)  Height: 5' 11" (180.3 cm) (02/02/17 2134)    PICC  Date/Time: 2/9/2017 4:20 PM  Performed by: JOSE GUADALUPE LE  Consent Done: Yes  Time out: Immediately prior to procedure a time out was called to verify the correct patient, procedure, equipment, support staff and site/side marked as required  Indications: med administration and vascular access  Anesthesia: local infiltration  Local anesthetic: lidocaine 1% without epinephrine  Anesthetic Total (mL): 3  Preparation: skin prepped with ChloraPrep  Skin prep agent dried: skin prep agent completely dried prior to procedure  Sterile barriers: all five maximum sterile barriers used - cap, mask, sterile gown, sterile gloves, and large sterile sheet  Hand hygiene: hand hygiene performed prior to central venous catheter insertion  Location details: right brachial  Catheter type: double lumen  Catheter size: 5 Fr  Catheter Length: 36cm    Ultrasound guidance: yes  Vessel Caliber: medium, compressibility normal  Vascular Doppler: not done  Needle advanced into vessel with real time Ultrasound guidance.  Guidewire confirmed in vessel.  Image recorded and saved.  Sterile sheath used.  no esophageal manometryNumber of attempts: 1  Post-procedure: blood return through all ports and sterile dressing applied  Estimated blood loss (mL): 0  Specimens: No  Implants: No  Assessment: placement verified by x-ray        Asmita Reyna  2/9/2017  "

## 2017-02-09 NOTE — PLAN OF CARE
Problem: Patient Care Overview  Goal: Plan of Care Review  Outcome: Ongoing (interventions implemented as appropriate)  Pt verbalizes no complaints. Denies CP, SOB, or other discomforts. Pt complains of pain. Has pain meds ordered PRN.   drip infusing as ordered. Pt remains free of fall or injury. Pt verbalizes understanding of plan of care. Will continue to monitor.

## 2017-02-09 NOTE — NURSING TRANSFER
Nursing Transfer Note      2/9/2017     Transfer To: [address]    Transfer via wheelchair    Transfer with cardiac monitoring    Transported by ERIK Cao RN    Medicines sent: Dobutamine 5mcg/kg/min infusing to [facility]     Chart send with patient: Yes    Notified: significant other at bedside    Patient reassessed at: 2/9/2017 @ 1445    Upon arrival to floor: patient oriented to room, call bell in reach and bed in lowest position, portable telemetry monitor on.     ADINA Barr at bedside to receive patient

## 2017-02-09 NOTE — PROGRESS NOTES
UPDATE    SW to pt's room for update.  Pt presents as lying in bed, sleeping.  Pt's caregiver, significant other Gregoria, presents at bedside as aao x4, calm, cooperative, and asking and answering questions appropriately.  SO verbalizes understanding of plan for pt to step down to regular floor room today and for d/c tomorrow.  SO reports that tomorrow is the only day they will have someone available to transport them home (SO reports a friend and her  will be coming in tomorrow to provide transportation).    SO verbalizes understanding of plan for pt to d/c home with dobutamine.  SW explained roles of infusion company and home health; SO reports no preference for home health or infusion agency.  SW referred pt to [facility] ([phone]).  Per Leni with [facility], they have accepted pt and began training with pt and SO today.  [facility] arranged HH for pt with [facility] in [place] ([phone]).    SO asking if pt can f/u with a cardiologist in [place] area, as they do not have reliable transportation to [place] for f/u here.  SW discussed with Dr. Keita, who reports pt can f/u with Dr. Lowell Hurst in [place].  Dr. Keita will contact Dr. Hurst re: pt's care.    Patient's caregiver verbalizes understanding and agreement with information reviewed,  availability, and how to access available resources as needed.  Patient's caregiver denies additional needs or concerns at this time.  SW providing ongoing psychosocial and counseling support, education, resources, assistance, and discharge planning as indicated.  SW following and remains available.

## 2017-02-09 NOTE — PLAN OF CARE
Problem: Patient Care Overview  Goal: Plan of Care Review  Outcome: Ongoing (interventions implemented as appropriate)  Patient AAOx4. Plan of care and all safety precautions maintained and discussed. SR-ST on telemetry. VS stable. SBP maintained, MAP maintained > 60. Dobutamine gtt at 5mcg/kg/min. PRN medication administered for complaint of back pain. No acute events. Call bell in reach. Encouraged to call for assistance. Verbalized understanding. Will continue to monitor.

## 2017-02-09 NOTE — NURSING
Pt assisted OOB to chair. Pt stable on his feet and tolerated transfer well c minimal assistance. VSS. Recliner in locked position. Call light within reach. Instructed patient not to get up without RN assistance and to use call light for any assistance needed. Pt verbalizes understanding

## 2017-02-09 NOTE — PLAN OF CARE
[facility]   [facility] Transplant [facility]  [address] [place] [address]   [phone] after hours        HOME  HEALTH ORDERS      Admit to Home Health    Diagnosis:   Patient Active Problem List   Diagnosis    Acromioclavicular joint separation, type 3    Chest pain    Cardiogenic shock    Coronary artery disease involving native coronary artery of native heart without angina pectoris    Ischemic cardiomyopathy    Acute on chronic systolic CHF (congestive heart failure), NYHA class 4    Paranoid schizophrenia    COCM (congestive cardiomyopathy)       Patient is homebound due to:  HFrEF    Diet: Cardiac    Acitivities: As tolerated    Nursing:   SN to complete comprehensive assessment including routine vital signs. Instruct on disease process and s/s of complications to report to MD. Review/verify medication list sent home with the patient at time of discharge  and instruct patient/caregiver as needed. Frequency may be adjusted depending on start of care date.    Notify MD if SBP > 160 or < 90; DBP > 90 or < 50; HR > 120 or < 50; Temp > 101; Weight gain >3lbs in 1 day or 5lbs in 1 week.   Other:       LABS:  SN to perform labs: CBC, CMP weekly    HOME INFUSION THERAPY:    SN to perform Infusion Therapy/Central Line Care.  Review Central Line Care & Central Line Flush with patient.    Administer (drug and dose):   Dobutamine 5mcg/kg/min - Dosing weight 71.9 kg      **For questions or concerns, please call [phone] and ask for Pre-Heart transplant clinic, M-F 8-5. After hours, weekends, call [phone] and ask for the Heart Transplant Cardiologist on call.**    Central line care:  Scrub the Hub: Prior to accessing the line, always perform a 30 second alcohol scrub  Each lumen of the central line is to be flushed at least daily with 10 mL Normal Saline and 3 mL Heparin flush (100 units/mL)  Skilled Nurse (SN) may draw blood from IV access  Blood Draw  Procedure:   - Aspirate at least 5 mL of blood   - Discard   - Obtain specimen   - Change posiflow cap   - Flush with 20 mL Normal Saline followed by a                 3-5 mL Heparin flush (100 units/mL)  Central :   - Sterile dressing changes are done weekly and as needed.   - Use chlor-hexadine scrub to cleanse site, apply Biopatch to insertion site,       apply securement device dressing   - Posi-flow caps are changed weekly and after EVERY lab draw.   - If sterile gauze is under dressing to control oozing,                 dressing change must be performed every 24 hours until gauze is not needed.      CONSULTS:    Physical Therapy to evaluate and treat. Evaluate for home safety and equipment needs; Establish/upgrade home exercise program. Perform / instruct on therapeutic exercises, gait training, transfer training, and Range of Motion.    Occupational Therapy to evaluate and treat. Evaluate home environment for safety and equipment needs. Perform/Instruct on transfers, ADL training, ROM, and therapeutic exercises.        Send initial Home Health orders to [facility] attending physician on call.  Send follow up questions to [phone] or fax:                      Heart Failure:      [phone]

## 2017-02-10 VITALS
OXYGEN SATURATION: 99 % | HEIGHT: 71 IN | TEMPERATURE: 98 F | WEIGHT: 145.31 LBS | RESPIRATION RATE: 18 BRPM | HEART RATE: 88 BPM | SYSTOLIC BLOOD PRESSURE: 95 MMHG | BODY MASS INDEX: 20.34 KG/M2 | DIASTOLIC BLOOD PRESSURE: 56 MMHG

## 2017-02-10 LAB
ALBUMIN SERPL BCP-MCNC: 2.5 G/DL
ALP SERPL-CCNC: 196 U/L
ALT SERPL W/O P-5'-P-CCNC: 198 U/L
ANION GAP SERPL CALC-SCNC: 6 MMOL/L
AST SERPL-CCNC: 83 U/L
BASOPHILS # BLD AUTO: 0.05 K/UL
BASOPHILS NFR BLD: 0.5 %
BILIRUB SERPL-MCNC: 1.9 MG/DL
BUN SERPL-MCNC: 18 MG/DL
CALCIUM SERPL-MCNC: 8.3 MG/DL
CHLORIDE SERPL-SCNC: 98 MMOL/L
CO2 SERPL-SCNC: 27 MMOL/L
CREAT SERPL-MCNC: 1 MG/DL
DIFFERENTIAL METHOD: ABNORMAL
EOSINOPHIL # BLD AUTO: 0.5 K/UL
EOSINOPHIL NFR BLD: 4.6 %
ERYTHROCYTE [DISTWIDTH] IN BLOOD BY AUTOMATED COUNT: 15.6 %
EST. GFR  (AFRICAN AMERICAN): >60 ML/MIN/1.73 M^2
EST. GFR  (NON AFRICAN AMERICAN): >60 ML/MIN/1.73 M^2
GLUCOSE SERPL-MCNC: 112 MG/DL
HCT VFR BLD AUTO: 32.2 %
HGB BLD-MCNC: 10.5 G/DL
INR PPP: 1.1
LYMPHOCYTES # BLD AUTO: 1.9 K/UL
LYMPHOCYTES NFR BLD: 18.3 %
MAGNESIUM SERPL-MCNC: 1.7 MG/DL
MCH RBC QN AUTO: 27.5 PG
MCHC RBC AUTO-ENTMCNC: 32.6 %
MCV RBC AUTO: 84 FL
MONOCYTES # BLD AUTO: 1.7 K/UL
MONOCYTES NFR BLD: 16.4 %
NEUTROPHILS # BLD AUTO: 6.3 K/UL
NEUTROPHILS NFR BLD: 59.6 %
PLATELET # BLD AUTO: 198 K/UL
PMV BLD AUTO: 9.8 FL
POTASSIUM SERPL-SCNC: 4.3 MMOL/L
PROT SERPL-MCNC: 5.5 G/DL
PROTHROMBIN TIME: 11.6 SEC
RBC # BLD AUTO: 3.82 M/UL
SODIUM SERPL-SCNC: 131 MMOL/L
WBC # BLD AUTO: 10.61 K/UL

## 2017-02-10 PROCEDURE — 83735 ASSAY OF MAGNESIUM: CPT

## 2017-02-10 PROCEDURE — 97110 THERAPEUTIC EXERCISES: CPT

## 2017-02-10 PROCEDURE — 97165 OT EVAL LOW COMPLEX 30 MIN: CPT

## 2017-02-10 PROCEDURE — 25000003 PHARM REV CODE 250: Performed by: INTERNAL MEDICINE

## 2017-02-10 PROCEDURE — 63600175 PHARM REV CODE 636 W HCPCS: Performed by: INTERNAL MEDICINE

## 2017-02-10 PROCEDURE — 85610 PROTHROMBIN TIME: CPT

## 2017-02-10 PROCEDURE — 25000003 PHARM REV CODE 250: Performed by: NURSE PRACTITIONER

## 2017-02-10 PROCEDURE — 99232 SBSQ HOSP IP/OBS MODERATE 35: CPT | Mod: ,,, | Performed by: INTERNAL MEDICINE

## 2017-02-10 PROCEDURE — 97116 GAIT TRAINING THERAPY: CPT

## 2017-02-10 PROCEDURE — 85025 COMPLETE CBC W/AUTO DIFF WBC: CPT

## 2017-02-10 PROCEDURE — 63600175 PHARM REV CODE 636 W HCPCS: Performed by: NURSE PRACTITIONER

## 2017-02-10 PROCEDURE — 80053 COMPREHEN METABOLIC PANEL: CPT

## 2017-02-10 PROCEDURE — 97530 THERAPEUTIC ACTIVITIES: CPT

## 2017-02-10 PROCEDURE — 25000003 PHARM REV CODE 250: Performed by: STUDENT IN AN ORGANIZED HEALTH CARE EDUCATION/TRAINING PROGRAM

## 2017-02-10 RX ORDER — ASPIRIN 81 MG/1
81 TABLET ORAL DAILY
Refills: 0 | COMMUNITY
Start: 2017-02-10 | End: 2018-02-10

## 2017-02-10 RX ORDER — CLOPIDOGREL BISULFATE 75 MG/1
75 TABLET ORAL DAILY
COMMUNITY

## 2017-02-10 RX ORDER — FUROSEMIDE 80 MG/1
80 TABLET ORAL DAILY
Qty: 90 TABLET | Refills: 11 | Status: ON HOLD | OUTPATIENT
Start: 2017-02-10 | End: 2017-03-17 | Stop reason: HOSPADM

## 2017-02-10 RX ORDER — GABAPENTIN 800 MG/1
800 TABLET ORAL 2 TIMES DAILY
COMMUNITY

## 2017-02-10 RX ORDER — MIRTAZAPINE 45 MG/1
45 TABLET, FILM COATED ORAL NIGHTLY
COMMUNITY

## 2017-02-10 RX ORDER — OXYCODONE HYDROCHLORIDE 5 MG/1
5 TABLET ORAL EVERY 6 HOURS PRN
Qty: 20 TABLET | Refills: 0 | Status: SHIPPED | OUTPATIENT
Start: 2017-02-10 | End: 2017-02-10

## 2017-02-10 RX ORDER — DOBUTAMINE HYDROCHLORIDE 400 MG/100ML
5 INJECTION, SOLUTION INTRAVENOUS CONTINUOUS
Qty: 250 ML
Start: 2017-02-10 | End: 2017-02-10

## 2017-02-10 RX ORDER — CYCLOBENZAPRINE HCL 5 MG
5 TABLET ORAL 3 TIMES DAILY PRN
Qty: 30 TABLET | Refills: 3 | Status: SHIPPED | OUTPATIENT
Start: 2017-02-10 | End: 2017-02-10

## 2017-02-10 RX ORDER — OXYCODONE HYDROCHLORIDE 5 MG/1
5 TABLET ORAL EVERY 6 HOURS PRN
Qty: 20 TABLET | Refills: 0 | Status: SHIPPED | OUTPATIENT
Start: 2017-02-10

## 2017-02-10 RX ORDER — LISINOPRIL 2.5 MG/1
2.5 TABLET ORAL 2 TIMES DAILY
Qty: 180 TABLET | Refills: 3 | Status: SHIPPED | OUTPATIENT
Start: 2017-02-10 | End: 2018-02-10

## 2017-02-10 RX ORDER — SPIRONOLACTONE 25 MG/1
25 TABLET ORAL DAILY
Qty: 90 TABLET | Refills: 11 | Status: SHIPPED | OUTPATIENT
Start: 2017-02-10 | End: 2018-02-10

## 2017-02-10 RX ORDER — SPIRONOLACTONE 25 MG/1
25 TABLET ORAL DAILY
Qty: 90 TABLET | Refills: 11 | Status: SHIPPED | OUTPATIENT
Start: 2017-02-10 | End: 2017-02-10

## 2017-02-10 RX ORDER — FUROSEMIDE 80 MG/1
80 TABLET ORAL DAILY
Qty: 90 TABLET | Refills: 11 | Status: SHIPPED | OUTPATIENT
Start: 2017-02-10 | End: 2017-02-10

## 2017-02-10 RX ORDER — CYCLOBENZAPRINE HCL 5 MG
5 TABLET ORAL 3 TIMES DAILY PRN
Qty: 30 TABLET | Refills: 3 | Status: SHIPPED | OUTPATIENT
Start: 2017-02-10 | End: 2017-02-20

## 2017-02-10 RX ORDER — CLONAZEPAM 0.5 MG/1
0.5 TABLET ORAL 2 TIMES DAILY
COMMUNITY

## 2017-02-10 RX ORDER — DOBUTAMINE HYDROCHLORIDE 400 MG/100ML
5 INJECTION, SOLUTION INTRAVENOUS CONTINUOUS
Qty: 250 ML
Start: 2017-02-10

## 2017-02-10 RX ORDER — MIRTAZAPINE 7.5 MG/1
7.5 TABLET, FILM COATED ORAL NIGHTLY
Status: ON HOLD | COMMUNITY
End: 2017-02-10

## 2017-02-10 RX ORDER — LISINOPRIL 2.5 MG/1
2.5 TABLET ORAL 2 TIMES DAILY
Qty: 180 TABLET | Refills: 3 | Status: SHIPPED | OUTPATIENT
Start: 2017-02-10 | End: 2017-02-10

## 2017-02-10 RX ADMIN — HEPARIN SODIUM 5000 UNITS: 5000 INJECTION, SOLUTION INTRAVENOUS; SUBCUTANEOUS at 01:02

## 2017-02-10 RX ADMIN — OXYCODONE HYDROCHLORIDE 5 MG: 5 TABLET ORAL at 08:02

## 2017-02-10 RX ADMIN — OXYCODONE HYDROCHLORIDE 5 MG: 5 TABLET ORAL at 01:02

## 2017-02-10 RX ADMIN — OXYCODONE HYDROCHLORIDE 5 MG: 5 TABLET ORAL at 04:02

## 2017-02-10 RX ADMIN — GABAPENTIN 800 MG: 400 CAPSULE ORAL at 08:02

## 2017-02-10 RX ADMIN — MORPHINE SULFATE 2 MG: 2 INJECTION, SOLUTION INTRAMUSCULAR; INTRAVENOUS at 02:02

## 2017-02-10 RX ADMIN — LISINOPRIL 2.5 MG: 2.5 TABLET ORAL at 08:02

## 2017-02-10 RX ADMIN — SODIUM CHLORIDE, PRESERVATIVE FREE 10 ML: 5 INJECTION INTRAVENOUS at 01:02

## 2017-02-10 RX ADMIN — MORPHINE SULFATE 2 MG: 2 INJECTION, SOLUTION INTRAMUSCULAR; INTRAVENOUS at 10:02

## 2017-02-10 RX ADMIN — HEPARIN SODIUM 5000 UNITS: 5000 INJECTION, SOLUTION INTRAVENOUS; SUBCUTANEOUS at 05:02

## 2017-02-10 RX ADMIN — POLYETHYLENE GLYCOL 3350 17 G: 17 POWDER, FOR SOLUTION ORAL at 08:02

## 2017-02-10 RX ADMIN — SODIUM CHLORIDE, PRESERVATIVE FREE 10 ML: 5 INJECTION INTRAVENOUS at 12:02

## 2017-02-10 RX ADMIN — CLOPIDOGREL 75 MG: 75 TABLET, FILM COATED ORAL at 08:02

## 2017-02-10 RX ADMIN — MORPHINE SULFATE 2 MG: 2 INJECTION, SOLUTION INTRAMUSCULAR; INTRAVENOUS at 06:02

## 2017-02-10 RX ADMIN — FUROSEMIDE 80 MG: 80 TABLET ORAL at 08:02

## 2017-02-10 RX ADMIN — SODIUM CHLORIDE, PRESERVATIVE FREE 10 ML: 5 INJECTION INTRAVENOUS at 05:02

## 2017-02-10 RX ADMIN — NICOTINE 1 PATCH: 7 PATCH, EXTENDED RELEASE TRANSDERMAL at 08:02

## 2017-02-10 RX ADMIN — DOBUTAMINE IN DEXTROSE 5 MCG/KG/MIN: 200 INJECTION, SOLUTION INTRAVENOUS at 04:02

## 2017-02-10 RX ADMIN — STANDARDIZED SENNA CONCENTRATE AND DOCUSATE SODIUM 2 TABLET: 8.6; 5 TABLET, FILM COATED ORAL at 08:02

## 2017-02-10 RX ADMIN — ASPIRIN 81 MG: 81 TABLET, COATED ORAL at 08:02

## 2017-02-10 RX ADMIN — SPIRONOLACTONE 25 MG: 25 TABLET, FILM COATED ORAL at 08:02

## 2017-02-10 NOTE — PLAN OF CARE
Problem: Patient Care Overview  Goal: Plan of Care Review  Outcome: Ongoing (interventions implemented as appropriate)  Pt verbalizes complains of pain. PRN oxy given.  Denies CP, SOB, or other discomforts.  drip infusing as ordered. Plans for pt to go home on . Pt remains free of fall or injury. Pt verbalizes understanding of plan of care. PT scheduled to be discharged home today.  Will continue to monitor.

## 2017-02-10 NOTE — PT/OT/SLP EVAL
"Occupational Therapy  Evaluation & Discharge    Urbano Hensley   MRN: [id number]   Admitting Diagnosis: Cardiogenic shock    OT Date of Treatment: 02/10/17   OT Start Time: 0945  OT Stop Time: 1013  OT Total Time (min): 28 min    Billable Minutes:  Evaluation 18 min  Therapeutic Exercise 10 min    Diagnosis: Cardiogenic shock       Past Medical History   Diagnosis Date    Acute on chronic systolic congestive heart failure 2017    Bipolar 1 disorder     Coronary artery disease     Coronary artery disease involving native coronary artery of native heart without angina pectoris 2017    Ischemic cardiomyopathy 2017    Paranoid schizophrenia       Past Surgical History   Procedure Laterality Date    Cardiac surgery      Shoulder surgery         Referring physician: Dr. Ortega  Date referred to OT: 17    General Precautions: Standard, fall  Orthopedic Precautions: N/A  Braces: N/A          Patient History:  Living Environment  Lives With: significant other  Living Arrangements: house  Home Accessibility: stairs to enter home  Number of Stairs to Enter Home: 4  Stair Railings at Home: outside, present at both sides  Transportation Available: family or friend will provide  Living Environment Comment:  (Pt lives in [facility] with 4 steps to enter with sign other - pt will have 24 hours S at d/c - pt's PLOF was I)  Equipment Currently Used at Home: none (pt has access to TTB)    Prior level of function:   Bed Mobility/Transfers: independent  Grooming: independent  Bathing: independent  Upper Body Dressing: independent  Lower Body Dressing: independent  Toileting: independent  Home Management Skills: independent  Driving License: Yes  Mode of Transportation: Car     Dominant hand: right    Subjective:  Communicated with nurse prior to session.  "I don't need that" referring to RW  Chief Complaint: pain in back  Patient/Family stated goals: return home today    Pain Ratin/10  Location - Side: Right   "   Location: back  Pain Addressed: Pre-medicate for activity, Cessation of Activity, Distraction, Reposition  Pain Rating Post-Intervention: 9/10    Objective:  Patient found with: peripheral IV    Cognitive Exam:  Oriented to: Person, Place, Time and Situation  Follows Commands/attention: Follows multistep  commands  Communication: clear/fluent  Memory:  No Deficits noted  Safety awareness/insight to disability: intact  Coping skills/emotional control: Appropriate to situation    Visual/perceptual:  Intact    Physical Exam:  Postural examination/scapula alignment: Rounded shoulder and Head forward  Skin integrity: Visible skin intact  Edema: None noted in UEs    Sensation:   Intact    Upper Extremity Range of Motion:  Right Upper Extremity: WFL  Left Upper Extremity: WFL    Upper Extremity Strength:  Right Upper Extremity: WFL  Left Upper Extremity: WFL   Strength: good    Fine motor coordination:   Intact    Gross motor coordination: WFL    Functional Mobility:  Bed Mobility:  Rolling/Turning to Left: Modified independent  Rolling/Turning Right: Modified independent  Scooting/Bridging: Modified Independent  Supine to Sit: Modified Independent    Transfers:  Sit <> Stand Assistance: Supervision  Sit <> Stand Assistive Device: No Assistive Device  Bed <> Chair Technique: Stand Pivot  Bed <> Chair Transfer Assistance: Supervision  Bed <> Chair Assistive Device: No Assistive Device  Toilet Transfer Technique: Stand Pivot  Toilet Transfer Assistance: Modified Independent  Toilet Transfer Assistive Device: No Assistive Device (hand on sink)    Functional Ambulation: Pt was able to walk from bedside <-> bathroom with 1 hand on IV pole without any assist (supervision only).    Activities of Daily Living:  UE dressing level of Assistance: Set up assist to [facility] gown while seated on EOB     LE Dressing Level of Assistance: Set-up Assistance (elham/doff socks - sitting on EOB)    Grooming Position: Standing at  "sink  Grooming Level of Assistance: Modified independent (wash hands)     Toileting Where Assessed: Toilet  Toileting Level of Assistance: Modified independent (hand on sink to stabilize - completed 3/3 steps)       Balance:   Static Sit: GOOD: Takes MODERATE challenges from all directions  Dynamic Sit: GOOD: Maintains balance through MODERATE excursions of active trunk movement  Static Stand: FAIR+: Takes MINIMAL challenges from all directions  Dynamic stand: GOOD-: Needs SUPERVISION only during gait and able to self right with moderate     Therapeutic Activities and Exercises:  · Pt performed ADLs and func mobility for evaluation as noted above  · OT discussed DME and UE exercises for home exercise program with pt and sign other  · OT and pt reviewed HEP for UE exercises - pt able to demonstrate exercises      AM-[facility] 6 CLICK ADL  How much help from another person does this patient currently need?  1 = Unable, Total/Dependent Assistance  2 = A lot, Maximum/Moderate Assistance  3 = A little, Minimum/Contact Guard/Supervision  4 = None, Modified [place]/Independent    Putting on and taking off regular lower body clothing? : 4  Bathing (including washing, rinsing, drying)?: 3  Toileting, which includes using toilet, bedpan, or urinal? : 4  Putting on and taking off regular upper body clothing?: 4  Taking care of personal grooming such as brushing teeth?: 4  Eating meals?: 4  Total Score: 23    AM-PAC Raw Score CMS "G-Code Modifier Level of Impairment Assistance   6 % Total / Unable   7 - 9 CM 80 - 100% Maximal Assist   10 - 14 CL 60 - 80% Moderate Assist   15 - 19 CK 40 - 60% Moderate Assist   20 - 22 CJ 20 - 40% Minimal Assist   23 CI 1-20% SBA / CGA   24 CH 0% Independent/ Mod I       Patient left sitting up in bed with all lines intact, call button in reach and girlfriend present    Assessment:  Urbano Hensley is a 46 y.o. male with a medical diagnosis of Cardiogenic shock and presents with  increased " pain in back, gait instability, required increased time to complete ADLs (due to pain), and decreased strength/endurance.  Pt performed all ADLs and func mobility at set up to mod I level during evaluation.  Pt will have 24 hours supervision at discharge with PLOF noted to be I.  Pt has access to TTB for tub transfers for safety at discharge.  OT does not recommend post acute OT services at this time.  Only DME recommendation for OT is TTB (pt states he will not use a RW).  UE exercise home program reviewed with pt and sign other for improving strength/endurance post discharge.        Rehab identified problem list/impairments: Rehab identified problem list/impairments: weakness, impaired endurance, pain, impaired balance, gait instability, impaired cardiopulmonary response to activity    Rehab potential is good.    Activity tolerance: Fair -  Limited by pain    Discharge recommendations: Discharge Facility/Level Of Care Needs: home     Barriers to discharge: Barriers to Discharge: Inaccessible home environment    Equipment recommendations:  (TTB - pt has one he can use from a relative)     GOALS:   Occupational Therapy Goals     Not on file      Multidisciplinary Problems (Resolved)        Problem: Occupational Therapy Goal    Goal Priority Disciplines Outcome Interventions   Occupational Therapy Goal   (Resolved)     OT, PT/OT Outcome(s) achieved              PLAN:  Patient to be seen   to address the above listed problems via    Plan of Care expires:    Plan of Care reviewed with:           Sharyn Solorzano OT  02/10/2017

## 2017-02-10 NOTE — PT/OT/SLP PROGRESS
"Physical Therapy  Treatment    Urbano Hensley   MRN: [id number]   Admitting Diagnosis: Cardiogenic shock    PT Received On: 02/10/17  PT Start Time: 1046     PT Stop Time: 1110    PT Total Time (min): 24 min       Billable Minutes:  Gait Training8, Therapeutic Activity 8 and Therapeutic Exercise 8    Treatment Type: Treatment  PT/PTA: PT           General Precautions: Standard, fall  Orthopedic Precautions: N/A   Braces: N/A    Do you have any cultural, spiritual, Taoist conflicts, given your current situation?: none stated    Subjective:  Communicated with RN prior to session.  Pt willing to participate with PT. Pt with c/o significant back pain due to prolonged bed immobility, not alleviating with any position. Received pain meds prior to PT tx. Pt reports he has been mobilizing within room. Eager to D/C home today.     Pain Rating: 10/10  Location - Side: Right  Location - Orientation: lower  Location: back  Pain Addressed: Pre-medicate for activity, Reposition, Distraction       Objective:   Patient found with: peripheral IV    Functional Mobility:  Bed Mobility:   Supine to Sit: Modified Independent  Sit to Supine: Modified Independent    Transfers:  Sit <> Stand Assistance: Supervision  Sit <> Stand Assistive Device: No Assistive Device    Gait:   Gait Distance: ~120 ft  Assistance 1: Contact Guard Assistance, Stand by Assistance  Gait Assistive Device:  (Intermittent one UE support on IV pole or wall railing. )  Gait Pattern: 2-point gait  Gait Deviation(s): decreased sandeep, increased time in double stance, decreased step length, increased stride width, forward lean (Pt demonstrating slow gait velocity with mild fwd trunk flex posture. Seeking UE support on wall railing, IV pole, furniture due to significant c/o back pain. )    Stairs:  Declined. States, "I won't have a problem, I have a handrail"    Balance:   Static Sit: GOOD: Takes MODERATE challenges from all directions  Dynamic Sit: GOOD: Maintains " balance through MODERATE excursions of active trunk movement  Static Stand: FAIR+: Takes MINIMAL challenges from all directions  Dynamic stand: FAIR+: Needs CLOSE SUPERVISION during gait and is able to right self with minor LOB     Therapeutic Activities and Exercises:  Sit <> stand transfers x 5 reps from chair, B UE push off.   Sitting LE therex x 10 reps.  Pt transferred up to chair at bedside, however, requested to return to supine in bed for comfort.       AM-PAC 6 CLICK MOBILITY  How much help from another person does this patient currently need?   1 = Unable, Total/Dependent Assistance  2 = A lot, Maximum/Moderate Assistance  3 = A little, Minimum/Contact Guard/Supervision  4 = None, Modified [place]/Independent    Turning over in bed (including adjusting bedclothes, sheets and blankets)?: 4  Sitting down on and standing up from a chair with arms (e.g., wheelchair, bedside commode, etc.): 3  Moving from lying on back to sitting on the side of the bed?: 4  Moving to and from a bed to a chair (including a wheelchair)?: 3  Need to walk in hospital room?: 3  Climbing 3-5 steps with a railing?: 3  Total Score: 20    AM-PAC Raw Score CMS G-Code Modifier Level of Impairment Assistance   6 % Total / Unable   7 - 9 CM 80 - 100% Maximal Assist   10 - 14 CL 60 - 80% Moderate Assist   15 - 19 CK 40 - 60% Moderate Assist   20 - 22 CJ 20 - 40% Minimal Assist   23 CI 1-20% SBA / CGA   24 CH 0% Independent/ Mod I     Patient left supine with all lines intact, call button in reach and significant other present.    Assessment:  Urbano Hensley is a 46 y.o. male with a medical diagnosis of Cardiogenic shock . Pt able to progress with functional mobility and gait s/p IABP removal. Pt primarily limited by c/o significant back pain, affecting mobility, stability and activity tolerance. Pt with pending D/C home today. Would benefit from continued  PT services upon D/C.    Rehab identified problem list/impairments:  Rehab identified problem list/impairments: pain, weakness, impaired endurance, impaired self care skills, impaired functional mobilty, gait instability, impaired balance, impaired cardiopulmonary response to activity    Rehab potential is good.    Activity tolerance: Fair    Discharge recommendations: Discharge Facility/Level Of Care Needs: home health PT     Barriers to discharge: Barriers to Discharge: Inaccessible home environment    Equipment recommendations: Equipment Needed After Discharge: cane, straight     GOALS:   Physical Therapy Goals        Problem: Physical Therapy Goal    Goal Priority Disciplines Outcome Goal Variances Interventions   Physical Therapy Goal     PT/OT, PT Ongoing (interventions implemented as appropriate)     Description:  Goals to be met by: 17     Patient will increase functional independence with mobility by performin. Supine to sit with Stand-by Assistance, once able to progress with functional mobility - MET  2. Sit to supine with Stand-by Assistance, once able to progress with functional mobility -  MET  3. Sit to stand transfer with Stand-by Assistance, once able to progress with functional mobility - MET  4. Bed to chair transfer with Stand-by Assistance, once able to progress with functional mobility - MET  5. Gait  x 300 feet with Stand-by Assistance using appropriate AD, once able to progress with mobility - NOT MET  6. Ascend/descend 4 stair with bilateral Handrails Contact Guard Assistance, once able to progress with mobility - NOT MET  7. Upper/Lower extremity exercise program x15 reps per handout, with independence - NOT MET                 PLAN:    Patient to be D/C home today.   Plan of Care reviewed with: patient, significant other         Bhumika Mann, PT  02/10/2017

## 2017-02-10 NOTE — DISCHARGE SUMMARY
[facility]  Discharge Summary      Admit Date: 2/2/2017    Discharge Date and Time:  02/10/2017 2:43 PM    Attending Physician: Ghulam Keita MD     Discharge Provider: Cruzito Polanco    Reason for Admission: Cardiogenic shock    Procedures Performed: * No surgery found *    Hospital Course: 46 y.o. gentleman with PMH of ICM (EF=15%), CAD (s/p PCI), HLD, HTN, depression and schizophrenia that presents from OSH for management of cardiogenic shock and work-up of advanced options.  Patient initially admitted to OSH on 1/29 with increasing abdominal swelling, SOB and nausea over a 3 day period. He also reports increased LE edema. Patient was supposed to see Dr. Keita on 2/3 in clinic for advanced options work-up. However, at OSH, he was found to be in cardiogenic shock with worsening ascites, renal dysfunction, elevated transaminases and worsening serum Na / K levels despite initiation of  and diuresis with Lasix. Plan was for patient to have IABP placed but was deferred due to elevated INR. Patient denies chest pain, orthopnea, PND, palpitations, lightheadedness, dizziness, syncope, LE edema. IABP was placed on admit and pt was aggresively diuresed. He was continued on  5mcg/kg/mn. He diuresed extremely well and his LFTs improved daily. His case was discussed and it was decided to not offer any advanced options at this time secondary to monetary issues/schizophrenia/chronic pain. GDMT was started. His IABP was weaned and eventually removed without incident. PICC line was placed and he was discharged home in good condition. He is to follow with Dr Hurst locally in [facility].     Final Diagnoses:   Principal Problem: Cardiogenic shock    Discharged Condition: good    Disposition: Home or Self Care    Follow Up/Patient Instructions:     Medications:  Reconciled Home Medications:   Current Discharge Medication List      START taking these medications    Details   aspirin (ECOTRIN) 81 MG EC  tablet Take 1 tablet (81 mg total) by mouth once daily.  Refills: 0      cyclobenzaprine (FLEXERIL) 5 MG tablet Take 1 tablet (5 mg total) by mouth 3 (three) times daily as needed for Muscle spasms.  Qty: 30 tablet, Refills: 3      DOBUTamine (DOBUTREX) 500 mg/250 mL (2,000 mcg/mL) Inject 359.5 mcg/min into the vein continuous.  Qty: 250 mL      furosemide (LASIX) 80 MG tablet Take 1 tablet (80 mg total) by mouth once daily.  Qty: 90 tablet, Refills: 11      lisinopril (PRINIVIL,ZESTRIL) 2.5 MG tablet Take 1 tablet (2.5 mg total) by mouth 2 (two) times daily.  Qty: 180 tablet, Refills: 3      oxycodone (ROXICODONE) 5 MG immediate release tablet Take 1 tablet (5 mg total) by mouth every 6 (six) hours as needed.  Qty: 20 tablet, Refills: 0      spironolactone (ALDACTONE) 25 MG tablet Take 1 tablet (25 mg total) by mouth once daily.  Qty: 90 tablet, Refills: 11         CONTINUE these medications which have NOT CHANGED    Details   clonazePAM (KLONOPIN) 0.5 MG tablet Take 0.5 mg by mouth 2 (two) times daily.      clopidogrel (PLAVIX) 75 mg tablet Take 75 mg by mouth once daily.      gabapentin (NEURONTIN) 800 MG tablet Take 800 mg by mouth 2 (two) times daily.      mirtazapine (REMERON) 45 MG tablet Take 45 mg by mouth every evening.      hydrocodone-acetaminophen 5-325mg (NORCO) 5-325 mg per tablet Take 2 tablets by mouth every 6 (six) hours as needed for Pain (breakthrough pain not controlled by other analgesics.  May cause sedation.). Take 1-2 tablets  Qty: 24 tablet, Refills: 0      pramoxine-hydrocortisone (PROCTOCREAM-HC) 1-1 % rectal cream Place rectally 3 (three) times daily.  Qty: 1 Tube, Refills: 0         STOP taking these medications       ibuprofen (ADVIL,MOTRIN) 600 MG tablet Comments:   Reason for Stopping:             No discharge procedures on file.  Follow-up Information     Follow up with Lowell Hurst MD In 2 weeks.

## 2017-02-10 NOTE — PROGRESS NOTES
Notified Cruzito Polanco that pt BP was 79/50. Ordered that it was okay to give pt lisinopril, lasix and spironolactone. Will continue to monitor

## 2017-02-10 NOTE — PLAN OF CARE
Problem: Physical Therapy Goal  Goal: Physical Therapy Goal  Goals to be met by: 17     Patient will increase functional independence with mobility by performin. Supine to sit with Stand-by Assistance, once able to progress with functional mobility - MET  2. Sit to supine with Stand-by Assistance, once able to progress with functional mobility - MET  3. Sit to stand transfer with Stand-by Assistance, once able to progress with functional mobility - MET  4. Bed to chair transfer with Stand-by Assistance, once able to progress with functional mobility - MET  5. Gait x 300 feet with Stand-by Assistance using appropriate AD, once able to progress with mobility - NOT MET  6. Ascend/descend 4 stair with bilateral Handrails Contact Guard Assistance, once able to progress with mobility - NOT MET  7. Upper/Lower extremity exercise program x15 reps per handout, with independence - NOT MET   Outcome: Ongoing (interventions implemented as appropriate)  Pt able to progress with gait and functional mobility s/p IABP removal. Pt primarily limited by c/o significant low back pain. Pending D/C home today with  services.

## 2017-02-10 NOTE — PROGRESS NOTES
PT D/C HOME PER MD ORDERS. TELE REMOVED, IV ACCESS REMOVED AND INTACT X1. VSS, NAD AND NO COMPLAINTS AT THIS TIME. PT GIVEN AND EXPLAINED MED LIST AND PRESCRIPTIONS. PT VERBALIZES COMPLETE UNDERSTANDING OF ALL D/C INSTRUCTIONS AND FOLLOW UP CARE. PT GIVEN PRINTED AVS.  PT AWAITING FAMILY ARRIVAL AND PT ESCORT. WILL CONTINUE TO MONITOR

## 2017-02-10 NOTE — PROGRESS NOTES
Notified on call physician that pt newly placed PICC line was oozing. Changed pt dressing and apply pressure dressing on top. Will continue to monitor.

## 2017-02-10 NOTE — PLAN OF CARE
Problem: Occupational Therapy Goal  Goal: Occupational Therapy Goal  Outcome: Outcome(s) achieved Date Met:  02/10/17  Pt was seen by OT for evaluation/discharge.  Pt was noted with increased pain in back, gait instability, required increased time to complete ADLs (due to pain), and decreased strength/endurance.  Pt performed all ADLs and func mobility at set up to mod I level during evaluation.  Pt will have 24 hours supervision at discharge with PLOF noted to be I.  Pt has access to TTB for tub transfers for safety at discharge.  OT does not recommend post acute OT services at this time.  Only DME recommendation for OT is TTB (pt states he will not use a RW).  UE exercise home program reviewed with pt and sign other for improving strength/endurance post discharge.       Sharyn Solorzano OT  2/10/2017

## 2017-02-10 NOTE — PROGRESS NOTES
DISCHARGE    SW to pt's room for discharge.  Pt presents as lying in bed with sig. frannie Kinney at bedside.  Pt and SO both present as aao x4, calm, cooperative, and asking and answering questions appropriately.  Pt and SO verbalize understanding and agreement with plan to d/c home today with home .  SO reports [facility] started training with pt and SO yesterday.  SANDRITA provided SO with phone number for [facility].  SANDRITA also advised pt and SO that Dr. Keita has recommended f/u with Dr. Lowell Hurst in [place].  Pt and SO verbalize understanding and report that Dr. Hurst appears to be in the same practice as pt's current cardiologists (Dr. Siddiqi & Dr. Ware).  Pt and SO both report they are coping well at this time.  Pt and SO do not identify any needs or concerns at this time.  SO confirms that they will have a ride home from friends today.    SANDRITA notified Leni with [facility] (ph: [phone], f: [phone]) of d/c today.  Leni reports pt is approved for services and they should have equipment delivered to pt's room around 2:30pm today.  SANDRITA notified pt's bedside nurse of scheduled delivery time.  SANDRITA spoke with Radha with [facility] (ph: [phone], f: [phone]) who confirms that they have accepted pt for HH services.  SANDRITA faxed copy of facesheet to [facility] per Radha's request.  SANDRITA providing ongoing psychosocial and counseling support, education, resources, assistance, and discharge planning as indicated.  SANDRITA remains available.

## 2017-02-10 NOTE — PT/OT/SLP DISCHARGE
Physical Therapy Discharge Summary    Urbano Hensley  MRN: [id number]   Cardiogenic shock   Patient Discharged from acute Physical Therapy on 2/10/17.  Please refer to prior PT noted date on 2/10/17 for functional status.     Assessment:   Goals partially met. Patient appropriate for care in another setting.  GOALS:   Physical Therapy Goals        Problem: Physical Therapy Goal    Goal Priority Disciplines Outcome Goal Variances Interventions   Physical Therapy Goal     PT/OT, PT Ongoing (interventions implemented as appropriate)     Description:  Goals to be met by: 17     Patient will increase functional independence with mobility by performin. Supine to sit with Stand-by Assistance, once able to progress with functional mobility - MET  2. Sit to supine with Stand-by Assistance, once able to progress with functional mobility -  MET  3. Sit to stand transfer with Stand-by Assistance, once able to progress with functional mobility - MET  4. Bed to chair transfer with Stand-by Assistance, once able to progress with functional mobility - MET  5. Gait  x 300 feet with Stand-by Assistance using appropriate AD, once able to progress with mobility - NOT MET  6. Ascend/descend 4 stair with bilateral Handrails Contact Guard Assistance, once able to progress with mobility - NOT MET  7. Upper/Lower extremity exercise program x15 reps per handout, with independence - NOT MET               Reasons for Discontinuation of Therapy Services  Transfer to alternate level of care.      Plan:  Patient Discharged to: Home with Home Health Service     Bhumika Mann, PT  2/10/2017  [phone]  .

## 2017-02-10 NOTE — PLAN OF CARE
Problem: Patient Care Overview  Goal: Plan of Care Review  Outcome: Ongoing (interventions implemented as appropriate)   infusing. Back pain controlled with rotation of PRN morphine IV and Oxy PO. RIJ okay to removed per MD Korina verbal order. Pt tolerated well.  PICC Okay to use per Nursing communication. Pt now Nurse draw. Plan of care reviewed with patient. VSS. Fall precaution in place. No acute events overnight. Will continue to monitor

## 2017-02-10 NOTE — PROGRESS NOTES
"Hospital Day: 9    Subjective:  Interval History: No c/o's overnight. Feels great.    Scheduled Meds:   aspirin  81 mg Oral Daily    clonazePAM  0.5 mg Oral BID    clopidogrel  75 mg Oral Daily    furosemide  80 mg Oral Daily    gabapentin  800 mg Oral BID    heparin (porcine)  5,000 Units Subcutaneous Q8H    lisinopril  2.5 mg Oral BID    mirtazapine  45 mg Oral QHS    nicotine  1 patch Transdermal Daily    polyethylene glycol  17 g Oral Daily    senna-docusate 8.6-50 mg  2 tablet Oral BID    sodium chloride 0.9%  10 mL Intravenous Q6H    spironolactone  25 mg Oral Daily     Continuous Infusions:   DOBUTamine 5 mcg/kg/min (02/10/17 0400)     PRN Meds:    Objective:  Vital signs in last 24 hours:   Temp:  [98 °F (36.7 °C)-98.5 °F (36.9 °C)] 98.5 °F (36.9 °C)  Pulse:  [] 92  Resp:  [13-24] 18  SpO2:  [92 %-99 %] 99 %  BP: (80-92)/(51-64) 80/51    Intake/Output last 3 shifts:  I/O last 3 completed shifts:  In: 1249.8 [P.O.:960; I.V.:289.8]  Out: 2500 [Urine:2500]  Intake/Output this shift:       Physical Exam:  Visit Vitals    BP (!) 80/51 (BP Location: Right arm, Patient Position: Lying, BP Method: Automatic)    Pulse 92    Temp 98.5 °F (36.9 °C) (Oral)    Resp 18    Ht 5' 11" (1.803 m)    Wt 65.9 kg (145 lb 4.5 oz)    SpO2 99%    BMI 20.26 kg/m2     General appearance: alert, appears stated age and cooperative  Neck: supple, symmetrical, trachea midline and Line in place  Lungs: diminished breath sounds bibasilar  Heart: regular rate and rhythm  Abdomen: soft, non-tender; bowel sounds normal; no masses,  no organomegaly  Extremities: extremities normal, atraumatic, no cyanosis or edema    Lab Review  Lab Results   Component Value Date     (L) 02/10/2017    K 4.3 02/10/2017    CL 98 02/10/2017    CO2 27 02/10/2017    BUN 18 02/10/2017    CREATININE 1.0 02/10/2017    CALCIUM 8.3 (L) 02/10/2017     Lab Results   Component Value Date    WBC 10.61 02/10/2017    HGB 10.5 (L) 02/10/2017    " HCT 32.2 (L) 02/10/2017    MCV 84 02/10/2017     02/10/2017        Assessment/Plan:  46 y.o. gentleman with PMH of ICM (EF=15%), CAD (s/p PCI), HLD, HTN, depression and schizophrenia that presents from OSH for management of cardiogenic shock and work-up of advanced options. IABP and R IJ CVC placed upon arrival for hemodynamic support and invasive monitoring of CVP.      Plan:   Cardiogenic shock/ ICM/ EF 10%/LVEDD 6.8 cm  - Continue  5mcg/kg/mn. Pt symptoms significantly improved since starting medication. LFTs improved significantly as well.   - Continue PO Lasix.     - Will continue titration of GDMT as tolerates for afterload reduction.  - Holding B-blocker now that he is on   - Currently with no ICD  - Plan to send home with palliative  at this point and to f/u in clinic. We will reassess his LVAD/OHTX candidacy as an outpt as we gauge his medical compliance.      CAD s/p PCI  - Continue statin, DAPT  - Continue high dose statin     Hx of Depression / Anxiety/Schizophrenia  - Continue home mirtazapine and klonopin  - Will order Invega once wife verifies dose(Will be due in March).

## 2017-03-02 ENCOUNTER — DOCUMENTATION ONLY (OUTPATIENT)
Dept: TRANSPLANT | Facility: CLINIC | Age: 47
End: 2017-03-02

## 2017-03-02 NOTE — PROGRESS NOTES
Outside laboratory studies from [facility] received on this patient.  These are reviewed and are in range with his hospital results prior to discharge.  [facility] is called and I spoke with Marie Wallis at [phone] and advised her that Mr. Hensley's care as been returned to Dr. Hurst with no scheduled follow up here. She is asked to have laboratory results and questions to his physician Dr. Hurst. She advised that she will update there records and send laboratories to there office.  Laboratory results will be re faxed to Dr. Hurst by [facility] and these results are sent for scanning here.

## 2017-03-11 PROBLEM — I50.9 HEART FAILURE: Status: ACTIVE | Noted: 2017-03-11

## 2017-03-11 PROBLEM — E87.70 HYPERVOLEMIA: Status: ACTIVE | Noted: 2017-03-11

## 2017-03-12 ENCOUNTER — HOSPITAL ENCOUNTER (INPATIENT)
Facility: HOSPITAL | Age: 47
LOS: 5 days | Discharge: LEFT BEFORE DISCHARGE | DRG: 291 | End: 2017-03-17
Attending: INTERNAL MEDICINE | Admitting: INTERNAL MEDICINE
Payer: MEDICARE

## 2017-03-12 DIAGNOSIS — I10 ESSENTIAL HYPERTENSION: ICD-10-CM

## 2017-03-12 DIAGNOSIS — I25.5 ISCHEMIC CARDIOMYOPATHY: ICD-10-CM

## 2017-03-12 DIAGNOSIS — I25.10 CORONARY ARTERY DISEASE INVOLVING NATIVE CORONARY ARTERY, ANGINA PRESENCE UNSPECIFIED, UNSPECIFIED WHETHER NATIVE OR TRANSPLANTED HEART: ICD-10-CM

## 2017-03-12 DIAGNOSIS — I25.10 CORONARY ARTERY DISEASE INVOLVING NATIVE CORONARY ARTERY OF NATIVE HEART WITHOUT ANGINA PECTORIS: ICD-10-CM

## 2017-03-12 DIAGNOSIS — I50.9 HEART FAILURE: ICD-10-CM

## 2017-03-12 DIAGNOSIS — I50.43 ACUTE ON CHRONIC COMBINED SYSTOLIC AND DIASTOLIC CHF, NYHA CLASS 4: ICD-10-CM

## 2017-03-12 PROBLEM — I50.22 CHRONIC SYSTOLIC HEART FAILURE: Status: ACTIVE | Noted: 2017-02-04

## 2017-03-12 LAB
ALLENS TEST: ABNORMAL
BNP SERPL-MCNC: 1733 PG/ML
DELSYS: ABNORMAL
ERYTHROCYTE [SEDIMENTATION RATE] IN BLOOD BY WESTERGREN METHOD: 11 MM/H
FIO2: 21
HCO3 UR-SCNC: 28.6 MMOL/L (ref 24–28)
INR PPP: 1.3
MODE: ABNORMAL
PCO2 BLDA: 39.3 MMHG (ref 35–45)
PH SMN: 7.47 [PH] (ref 7.35–7.45)
PHOSPHATE SERPL-MCNC: 3.5 MG/DL
PO2 BLDA: 29 MMHG (ref 40–60)
POC BE: 5 MMOL/L
POC SATURATED O2: 61 % (ref 95–100)
POC TCO2: 30 MMOL/L (ref 24–29)
PROTHROMBIN TIME: 13.4 SEC
SAMPLE: ABNORMAL
SITE: ABNORMAL
SP02: 98

## 2017-03-12 PROCEDURE — 99223 1ST HOSP IP/OBS HIGH 75: CPT | Mod: ,,, | Performed by: INTERNAL MEDICINE

## 2017-03-12 PROCEDURE — 20000000 HC ICU ROOM

## 2017-03-12 PROCEDURE — 25000003 PHARM REV CODE 250: Performed by: INTERNAL MEDICINE

## 2017-03-12 PROCEDURE — 93010 ELECTROCARDIOGRAM REPORT: CPT | Mod: ,,, | Performed by: INTERNAL MEDICINE

## 2017-03-12 PROCEDURE — 82803 BLOOD GASES ANY COMBINATION: CPT

## 2017-03-12 PROCEDURE — 83880 ASSAY OF NATRIURETIC PEPTIDE: CPT

## 2017-03-12 PROCEDURE — 63600175 PHARM REV CODE 636 W HCPCS: Performed by: INTERNAL MEDICINE

## 2017-03-12 PROCEDURE — 85610 PROTHROMBIN TIME: CPT

## 2017-03-12 PROCEDURE — 84100 ASSAY OF PHOSPHORUS: CPT

## 2017-03-12 RX ORDER — SODIUM CHLORIDE 0.9 % (FLUSH) 0.9 %
3 SYRINGE (ML) INJECTION EVERY 8 HOURS
Status: DISCONTINUED | OUTPATIENT
Start: 2017-03-12 | End: 2017-03-14

## 2017-03-12 RX ORDER — CLOPIDOGREL BISULFATE 75 MG/1
75 TABLET ORAL DAILY
Status: DISCONTINUED | OUTPATIENT
Start: 2017-03-13 | End: 2017-03-17 | Stop reason: HOSPADM

## 2017-03-12 RX ORDER — SPIRONOLACTONE 25 MG/1
25 TABLET ORAL DAILY
Status: DISCONTINUED | OUTPATIENT
Start: 2017-03-13 | End: 2017-03-17 | Stop reason: HOSPADM

## 2017-03-12 RX ORDER — MIRTAZAPINE 15 MG/1
45 TABLET, FILM COATED ORAL NIGHTLY
Status: DISCONTINUED | OUTPATIENT
Start: 2017-03-12 | End: 2017-03-15

## 2017-03-12 RX ORDER — LISINOPRIL 2.5 MG/1
2.5 TABLET ORAL 2 TIMES DAILY
Status: DISCONTINUED | OUTPATIENT
Start: 2017-03-12 | End: 2017-03-17 | Stop reason: HOSPADM

## 2017-03-12 RX ORDER — DOBUTAMINE HYDROCHLORIDE 400 MG/100ML
5 INJECTION INTRAVENOUS CONTINUOUS
Status: DISCONTINUED | OUTPATIENT
Start: 2017-03-12 | End: 2017-03-17 | Stop reason: HOSPADM

## 2017-03-12 RX ORDER — ONDANSETRON 2 MG/ML
4 INJECTION INTRAMUSCULAR; INTRAVENOUS EVERY 12 HOURS PRN
Status: DISCONTINUED | OUTPATIENT
Start: 2017-03-12 | End: 2017-03-17 | Stop reason: HOSPADM

## 2017-03-12 RX ORDER — ASPIRIN 81 MG/1
81 TABLET ORAL DAILY
Status: DISCONTINUED | OUTPATIENT
Start: 2017-03-13 | End: 2017-03-17 | Stop reason: HOSPADM

## 2017-03-12 RX ORDER — ACETAMINOPHEN 325 MG/1
650 TABLET ORAL EVERY 4 HOURS PRN
Status: DISCONTINUED | OUTPATIENT
Start: 2017-03-12 | End: 2017-03-17 | Stop reason: HOSPADM

## 2017-03-12 RX ORDER — GABAPENTIN 400 MG/1
800 CAPSULE ORAL 2 TIMES DAILY
Status: DISCONTINUED | OUTPATIENT
Start: 2017-03-12 | End: 2017-03-17 | Stop reason: HOSPADM

## 2017-03-12 RX ORDER — CLONAZEPAM 0.5 MG/1
0.5 TABLET ORAL 2 TIMES DAILY
Status: DISCONTINUED | OUTPATIENT
Start: 2017-03-12 | End: 2017-03-17 | Stop reason: HOSPADM

## 2017-03-12 RX ADMIN — FUROSEMIDE 10 MG/HR: 10 INJECTION, SOLUTION INTRAMUSCULAR; INTRAVENOUS at 08:03

## 2017-03-12 RX ADMIN — ALTEPLASE 2 MG: 2.2 INJECTION, POWDER, LYOPHILIZED, FOR SOLUTION INTRAVENOUS at 10:03

## 2017-03-12 RX ADMIN — MIRTAZAPINE 45 MG: 15 TABLET, FILM COATED ORAL at 10:03

## 2017-03-12 RX ADMIN — GABAPENTIN 800 MG: 100 CAPSULE ORAL at 10:03

## 2017-03-12 RX ADMIN — CLONAZEPAM 0.5 MG: 0.5 TABLET ORAL at 10:03

## 2017-03-12 RX ADMIN — DOBUTAMINE IN DEXTROSE 5 MCG/KG/MIN: 400 INJECTION, SOLUTION INTRAVENOUS at 08:03

## 2017-03-12 NOTE — IP AVS SNAPSHOT
[facility]  [address]  [facility] [address]  Phone: [phone]           Patient Discharge Instructions     Our goal is to set you up for success. This packet includes information on your condition, medications, and your home care. It will help you to care for yourself so you don't get sicker and need to go back to the hospital.     Please ask your nurse if you have any questions.        There are many details to remember when preparing to leave the hospital. Here is what you will need to do:    1. Take your medicine. If you are prescribed medications, review your Medication List in the following pages. You may have new medications to  at the pharmacy and others that you'll need to stop taking. Review the instructions for how and when to take your medications. Talk with your doctor or nurses if you are unsure of what to do.     2. Go to your follow-up appointments. Specific follow-up information is listed in the following pages. Your may be contacted by a transition nurse or clinical provider about future appointments. Be sure we have all of the phone numbers to reach you, if needed. Please contact your provider's office if you are unable to make an appointment.     3. Watch for warning signs. Your doctor or nurse will give you detailed warning signs to watch for and when to call for assistance. These instructions may also include educational information about your condition. If you experience any of warning signs to your health, call your doctor.               [facility]  Unless otherwise directed by your provider, please contact [facility], our nurse care line that is available for 24/7 assistance.     [phone] (toll-free)    Registered nurses in the [facility] provide clinical advisement, health education, appointment booking, and other advisory services.                    ** Verify the list of medication(s) below is accurate and up  to date. Carry this with you in case of emergency. If your medications have changed, please notify your healthcare provider.             Medication List      START taking these medications        Additional Info    Begin Date AM Noon PM Bedtime    bumetanide 2 MG tablet   Commonly known as:  BUMEX   Quantity:  60 tablet   Refills:  3   Dose:  2 mg    Last time this was given:  2 mg on 3/17/2017  9:18 AM   Instructions:  Take 1 tablet (2 mg total) by mouth 2 (two) times daily.                       Take tonight's dose           potassium chloride 10 MEQ Cpsr   Commonly known as:  MICRO-K   Quantity:  30 capsule   Refills:  3   Dose:  20 mEq    Last time this was given:  40 mEq on 3/17/2017  9:18 AM   Instructions:  Take 2 capsules (20 mEq total) by mouth once daily.                                 CONTINUE taking these medications        Additional Info    Begin Date AM Noon PM Bedtime    aspirin 81 MG EC tablet   Commonly known as:  ECOTRIN   Refills:  0   Dose:  81 mg    Last time this was given:  81 mg on 3/17/2017  9:18 AM   Instructions:  Take 1 tablet (81 mg total) by mouth once daily.                               clonazePAM 0.5 MG tablet   Commonly known as:  KLONOPIN   Refills:  0   Dose:  0.5 mg    Last time this was given:  0.5 mg on 3/16/2017  8:52 PM   Instructions:  Take 0.5 mg by mouth 2 (two) times daily.                       Take tonight's dose           clopidogrel 75 mg tablet   Commonly known as:  PLAVIX   Refills:  0   Dose:  75 mg    Last time this was given:  75 mg on 3/17/2017  9:18 AM   Instructions:  Take 75 mg by mouth once daily.                               DOBUTamine 500 mg/250 mL (2,000 mcg/mL)   Commonly known as:  DOBUTREX   Quantity:  250 mL   Refills:  0   Dose:  5 mcg/kg/min    Instructions:  Inject 359.5 mcg/min into the vein continuous.                            gabapentin 800 MG tablet   Commonly known as:  NEURONTIN   Refills:  0   Dose:  800 mg    Instructions:  Take 800 mg  by mouth 2 (two) times daily.                       Take tonight's dose           hydrocodone-acetaminophen 5-325mg 5-325 mg per tablet   Commonly known as:  NORCO   Quantity:  24 tablet   Refills:  0   Dose:  2 tablet    Instructions:  Take 2 tablets by mouth every 6 (six) hours as needed for Pain (breakthrough pain not controlled by other analgesics.  May cause sedation.). Take 1-2 tablets                            lisinopril 2.5 MG tablet   Commonly known as:  PRINIVIL,ZESTRIL   Quantity:  180 tablet   Refills:  3   Dose:  2.5 mg    Last time this was given:  2.5 mg on 3/17/2017  9:18 AM   Instructions:  Take 1 tablet (2.5 mg total) by mouth 2 (two) times daily.                       Take tonight's dose           mirtazapine 45 MG tablet   Commonly known as:  REMERON   Refills:  0   Dose:  45 mg    Last time this was given:  45 mg on 3/13/2017  8:53 PM   Instructions:  Take 45 mg by mouth every evening.                    Take tonight's dose           oxycodone 5 MG immediate release tablet   Commonly known as:  ROXICODONE   Quantity:  20 tablet   Refills:  0   Dose:  5 mg    Last time this was given:  5 mg on 3/17/2017  6:12 AM   Instructions:  Take 1 tablet (5 mg total) by mouth every 6 (six) hours as needed.                            pramoxine-hydrocortisone 1-1 % rectal cream   Commonly known as:  PROCTOCREAM-HC   Quantity:  1 Tube   Refills:  0    Instructions:  Place rectally 3 (three) times daily.                            spironolactone 25 MG tablet   Commonly known as:  ALDACTONE   Quantity:  90 tablet   Refills:  11   Dose:  25 mg    Last time this was given:  25 mg on 3/17/2017  9:18 AM   Instructions:  Take 1 tablet (25 mg total) by mouth once daily.                                 STOP taking these medications     furosemide 80 MG tablet   Commonly known as:  LASIX            Where to Get Your Medications      These medications were sent to [facility] [address] [place] [address]  " [address] [facility] [place] [address]     Phone:  [phone]     bumetanide 2 MG tablet    potassium chloride 10 MEQ Cpsr                  Please bring to all follow up appointments:    1. A copy of your discharge instructions.  2. All medicines you are currently taking in their original bottles.  3. Identification and insurance card.    Please arrive 15 minutes ahead of scheduled appointment time.    Please call 24 hours in advance if you must reschedule your appointment and/or time.            Primary Diagnosis     Your primary diagnosis was:  Acute On Chronic Combined Systolic And Diastolic Chf, Nyha Class 4      Admission Information     Date & Time Provider Department CSN    3/12/2017  8:17 PM Hasmukh Sewell Jr., MD [facility] [id number]      Care Providers     Provider Role Specialty Primary office phone    Hasmukh Sewell Jr., MD Attending Provider Cardiology [phone]    Ghulam Keita MD Consulting Physician  Cardiology [phone]    Jaci Nieto MD Team Attending  Cardiology [phone]      Your Vitals Were     BP Pulse Temp Resp    83/57 (BP Location: Right arm, Patient Position: Lying, BP Method: Automatic) 94 97.5 °F (36.4 °C) (Oral) 18    Height Weight SpO2 BMI    5' 11" (1.803 m) 66.1 kg (145 lb 11.6 oz) 98% 20.32 kg/m2      Recent Lab Values     No lab values to display.      Allergies as of 3/17/2017     No Known Allergies      Advance Directives     An advance directive is a document which, in the event you are no longer able to make decisions for yourself, tells your healthcare team what kind of treatment you do or do not want to receive, or who you would like to make those decisions for you.  If you do not currently have an advance directive, [facility] encourages you to create one.  For more information call:  [phone]), [phone] ([phone]),  or log on to [email or web address].        Language Assistance Services     ATTENTION: " Language assistance services are available, free of charge. Please call [phone].      ATENCIÓN: Si kerry escobar, tiene a zabala disposición servicios gratuitos de asistencia lingüística. Llame al [phone].     [facility] Ý: N?u b?n nói Ti?ng Vi?t, có các d?ch v? h? tr? ngôn ng? mi?n phí dành cho b?n. G?i s? [phone].        Heart Failure Education       Heart Failure: Being Active  You have a condition called heart failure. Being active doesnt mean that you have to wear yourself out. Even a little movement each day helps to strengthen your heart. If you cant get out to exercise, you can do simple stretching and strengthening exercises at home. These are good ways to keep you well-conditioned and prevent you and your heart from becoming excessively weak.    Ideas to get you started  · Add a little movement to things you do now. Walk to mail letters. Park your car at the far end of the parking lot and walk to the store. Walk up a flight of stairs instead of taking the elevator.  · Choose activities you enjoy. You might walk, swim, or ride an exercise bike. Things like gardening and washing the car count, too. Other possibilities include: washing dishes, walking the dog, walking around the mall, and doing aerobic activities with friends.  · Join a group exercise program at a [facility] or [facility], a senior center, or a community center. Or look into a hospital cardiac rehabilitation program. Ask your doctor if you qualify.  Tips to keep you going  · Get up and get dressed each day. Go to a coffee shop and read a newspaper or go somewhere that you'll be in the presence of other active people. Youll feel more like being active.  · Make a plan. Choose one or more activities that you enjoy and that you can easily do. Then plan to do at least one each day. You might write your plan on a calendar.  · Go with a friend or a group if you like company. This can help you feel supported and stay motivated, too.  · Plan social  events that you enjoy. This will keep you mentally engaged as well as physically motivated to do things you find pleasure in.  For your safety  · Talk with your healthcare provider before starting an exercise program.  · Exercise indoors when its too hot or too cold outside, or when the air quality is poor. Try walking at a shopping mall.  · Wear socks and sturdy shoes to maintain your balance and prevent falls.  · Start slowly. Do a few minutes several times a day at first. Increase your time and speed little by little.  · Stop and rest whenever you feel tired or get short of breath.  · Dont push yourself on days when you dont feel well.  Date Last Reviewed: 3/20/2016  © 0014-8012 [employer]. [address] [place] [address], [place] [address]. All rights reserved. This information is not intended as a substitute for professional medical care. Always follow your healthcare professional's instructions.              Heart Failure: Evaluating Your Heart  You have a condition called heart failure. To evaluate your condition, your doctor will examine you, ask questions, and do some tests. Along with looking for signs of heart failure, the doctor looks for any other health problems that may have led to heart failure. The results of your evaluation will help your doctor form a treatment plan.  Health history and physical exam  Your visit will start with a health history. Tell the doctor about any symptoms youve noticed and about all medicines you take. Then youll have a physical exam. This includes listening to your heartbeat and breathing. Youll also be checked for swelling (edema) in your legs and neck. When you have fluid buildup or fluid in the lungs, it may be called congestive heart failure.  Diagnosing heart failure     During an echocardiogram, sound waves bounce off the heart. These are converted into a picture on the screen.   The following may be done to help your doctor form a  diagnosis:  · X-rays show the size and shape of your heart. These pictures can also show fluid in your lungs.  · An electrocardiogram (ECG or EKG) shows the pattern of your heartbeat. Small pads (electrodes) are placed on your chest, arms, and legs. Wires connect the pads to the ECG machine, which records your hearts electrical signals. This can give the doctor information about heart function.  · An echocardiogram uses ultrasound waves to show the structure and movement of your heart muscle. This shows how well the heart pumps. It also shows the thickness of the heart walls, and if the heart is enlarged. It is one of the most useful, non-invasive tests as it provides information about the heart's general function. This helps your doctor make treatment decisions.  · Lab tests evaluate small amounts of blood or urine for signs of problems. A BNP lab test can help diagnose and evaluate heart failure. BNP stands for B-type natriuretic peptide. The ventricles secrete more BNP when heart failure worsens. Lab tests can also provide information about metabolic dysfunction or heart dysfunction.  Your treatment plan  Based on the results of your evaluation and tests, your doctor will develop a treatment plan. This plan is designed to relieve some of your heart failure symptoms and help make you more comfortable. Your treatment plan may include:  · Medicine to help your heart work better and improve your quality of life  · Changes in what you eat and drink to help prevent fluid from backing up in your body  · Daily monitoring of your weight and heart failure symptoms to see how well your treatment plan is working  · Exercise to help you stay healthy  · Help with quitting smoking  · Emotional and psychological support to help adjust to the changes  · Referrals to other specialists to make sure you are being treated comprehensively  Date Last Reviewed: 3/21/2016  © 9159-3729 [employer]. [address] [place] [address],  [place] [address]. All rights reserved. This information is not intended as a substitute for professional medical care. Always follow your healthcare professional's instructions.              Heart Failure: Making Changes to Your Diet  You have a condition called heart failure. When you have heart failure, excess fluid is more likely to build up in your body because your heart isn't working well. This makes the heart work harder to pump blood. Fluid buildup causes symptoms such as shortness of breath and swelling (edema). This is often referred to as congestive heart failure or CHF. Controlling the amount of salt (sodium) you eat may help stop fluid from building up. Your doctor may also tell you to reduce the amount of fluid you drink.  Reading food labels    Your healthcare provider will tell you how much sodium you can eat each day. Read food labels to keep track. Keep in mind that certain foods are high in salt. These include canned, frozen, and processed foods. Check the amount of sodium in each serving. Watch out for high-sodium ingredients. These include MSG (monosodium glutamate), baking soda, and sodium phosphate.   Eating less salt  Give yourself time to get used to eating less salt. It may take a little while. Here are some tips to help:  · Take the saltshaker off the table. Replace it with salt-free herb mixes and spices.  · Eat fresh or plain frozen vegetables. These have much less salt than canned vegetables.  · Choose low-sodium snacks like sodium-free pretzels, crackers, or air-popped popcorn.  · Dont add salt to your food when youre cooking. Instead, season your foods with pepper, lemon, garlic, or onion.  · When you eat out, ask that your food be cooked without added salt.  · Avoid eating fried foods as these often have a great deal of salt.  If youre told to limit fluids  You may need to limit how much fluid you have to help prevent swelling. This includes anything that is liquid at room  temperature, such as ice cream and soup. If your doctor tells you to limit fluid, try these tips:  · Measure drinks in a measuring cup before you drink them. This will help you meet daily goals.  · Chill drinks to make them more refreshing.  · Suck on frozen lemon wedges to quench thirst.  · Only drink when youre thirsty.  · Chew sugarless gum or suck on hard candy to keep your mouth moist.  · Weigh yourself daily to know if your body's fluid content is rising.  My sodium goal  Your healthcare provider may give you a sodium goal to meet each day. This includes sodium found in food as well as salt that you add. My goal is to eat no more than ___________ mg of sodium per day.     When to call your doctor  Call your doctor right away if you have any symptoms of worsening heart failure. These can include:  · Sudden weight gain  · Increased swelling of your legs or ankles  · Trouble breathing when youre resting or at night  · Increase in the number of pillows you have to sleep on  · Chest pain, pressure, discomfort, or pain in the jaw, neck, or back   Date Last Reviewed: 3/21/2016  © 1744-0311 [employer]. [address] [place] [address], [place] [address]. All rights reserved. This information is not intended as a substitute for professional medical care. Always follow your healthcare professional's instructions.              Heart Failure: Medicines to Help Your Heart    You have a condition called heart failure (also known as congestive heart failure, or CHF). Your doctor will likely prescribe medicines for heart failure and any underlying health problems you have. Most heart failure patients take one or more types of medicinen. Your healthcare provider will work to find the combination of medicines that works best for you.  Heart failure medicines  Here are the most common heart failure medicines:  · ACE inhibitors lower blood pressure and decrease strain on the heart. This makes it easier for the heart to  pump. Angiotensin receptor blockers have similar effects. These are prescribed for some patients instead of ACE inhibitors.  · Beta-blockers relieve stress on the heart. They also improve symptoms. They may also improve the heart's pumping action over time.  · Diuretics (also called water pills) help rid your body of excess water. This can help rid your body of swelling (edema). Having less fluid to pump means your heart doesnt have to work as hard. Some diuretics make your body lose a mineral called potassium. Your doctor will tell you if you need to take supplements or eat more foods high in potassium.  · Digoxin helps your heart pump with more strength. This helps your heart pump more blood with each beat. So, more oxygen-rich blood travels to the rest of the body.  · Aldosterone antagonists help alter hormones and decrease strain on the heart.  · Hydralazine and nitrates are two separate medicines used together to treat heart failure. They may come in one combination pill. They lower blood pressure and decrease how hard the heart has to pump.  Medicines for related conditions  Controlling other heart problems helps keep heart failure under control, too. Depending on other heart problems you have, medicines may be prescribed to:  · Lower blood pressure (antihypertensives).  · Lower cholesterol levels (statins).  · Prevent blood clots (anticoagulants or aspirin).  · Keep the heartbeat steady (antiarrhythmics).  Date Last Reviewed: 3/5/2016  © 8325-2793 [employer]. [address] [place] [address]. All rights reserved. This information is not intended as a substitute for professional medical care. Always follow your healthcare professional's instructions.              Heart Failure: Procedures That May Help    The heart is a muscle that pumps oxygen-rich blood to all parts of the body. When you have heart failure, the heart is not able to pump as well as it should. Blood and fluid may  back up into the lungs (congestive heart failure), and some parts of the body dont get enough oxygen-rich blood to work normally. These problems lead to the symptoms of heart failure.     Certain procedures may help the heart pump better in some cases of heart failure. Some procedures are done to treat health problems that may have caused the heart failure such as coronary artery disease or heart rhythm problems. For more serious heart failure, other options are available.  Treating artery and valve problems  If you have coronary artery disease or valve disease, procedures may be done to improve blood flow. This helps the heart pump better, which can improve heart failure symptoms. First, your doctor may do a cardiac catheterization to help detect clogged blood vessels or valve damage. During this procedure, a  thin tube (catheter) in inserted into a blood vessel and guided to the heart. There a dye is injected and a special type of X-ray (angiogram) is taken of the blood vessels. Procedures to open a blocked artery or fix damaged valves can also be done using catheterization.  · Angioplasty uses a balloon-tipped instrument at the end of the catheter. The balloon is inflated to widen the narrowed artery. In many cases, a stent is expanded to further support the narrowed artery. A stent is a metal mesh tube.  · Valve surgery repairs or replacement of faulty valves can also be done during catheterization so blood can flow properly through the chambers of the heart.  Bypass surgery is another option to help treat blocked arteries. It uses a healthy blood vessel from elsewhere in the body. The healthy blood vessel is attached above and below the blocked area so that blood can flow around the blocked artery.  Treating heart rhythm problems  A device may be placed in the chest to help a weak heart maintain a healthy, heartbeat so the heart can pump more effectively:  · Pacemaker. A pacemaker is an implanted device that  regulates your heartbeat electronically. It monitors your heart's rhythm and generates a painless electric impulse that helps the heart beat in a regular rhythm. A pacemaker is programmed to meet your specific heart rhythm needs.  · Biventricular pacing/cardiac resynchronization therapy. A type of pacemaker that paces both pumping chambers of the heart at the same time to coordinate contractions and to improve the heart's function. Some people with heart failure are candidates for this therapy.  · Implantable cardioverter defibrillator. A device similar to a pacemaker that senses when the heart is beating too fast and delivers an electrical shock to convert the fast rhythm to a normal rhythm. This can be a life saving device.  In severe cases  In more serious cases of heart failure when other treatments no longer work, other options may include:  · Ventricular assist devices (VADs). These are mechanical devices used to take over the pumping function for one or both of the heart's ventricles, or pumping chambers. A VAD may be necessary when heart failure progresses to the point that medicines and other treatments no longer help. In some cases, a VAD may be used as a bridge to transplant.  · Heart transplant. This is replacing the diseased heart with a healthy one from a donor. This is an option for a few people who are very sick. A heart transplant is very serious and not an option for all patients. Your doctor can tell you more.  Date Last Reviewed: 3/20/2016  © 2446-5949 [employer]. [address] [place] [address]. All rights reserved. This information is not intended as a substitute for professional medical care. Always follow your healthcare professional's instructions.              Heart Failure: Tracking Your Weight  You have a condition called heart failure. When you have heart failure, a sudden weight gain or a steady rise in weight is a warning sign that your body is retaining too  much water and salt. This could mean your heart failure is getting worse. If left untreated, it can cause problems for your lungs and result in shortness of breath. Weighing yourself each day is the best way to know if youre retaining water. If your weight goes up quickly, call your doctor. You will be given instructions on how to get rid of the excess water. You will likely need medicines and to avoid salt. This will help your heart work better.  Call your doctor if you gain more than 2 pounds in 1 day, more than 5 pounds in 1 week, or whatever weight gain you were told to report by your doctor. This is often a sign of worsening heart failure and needs to be evaluated and treated. Your doctor will tell you what to do next.   Tips for weighing yourself    · Weigh yourself at the same time each morning, wearing the same clothes. Weigh yourself after urinating and before eating.  · Use the same scale each day. Make sure the numbers are easy to read. Put the scale on a flat, hard surface -- not on a rug or carpet.  · Do not stop weighing yourself. If you forget one day, weigh again the next morning.  How to use your weight chart  · Keep your weight chart near the scale. Write your weight on the chart as soon as you get off the scale.  · Fill in the month and the start date on the chart. Then write down your weight each day. Your chart will look like this:    · If you miss a day, leave the space blank. Weigh yourself the next day and write your weight in the next space.  · Take your weight chart with you when you go to see your doctor.  Date Last Reviewed: 3/20/2016  © 8338-1454 [employer]. [address] [place] [address], [place] [address]. All rights reserved. This information is not intended as a substitute for professional medical care. Always follow your healthcare professional's instructions.              Heart Failure: Warning Signs of a Flare-Up  You have a condition called heart failure. Once you have  heart failure, flare-ups can happen. Below are signs that can mean your heart failure is getting worse. If you notice any of these warning signs, call your healthcare provider.  Swelling    · Your feet, ankles, or lower legs get puffier.  · You notice skin changes on your lower legs.  · Your shoes feel too tight.  · Your clothes are tighter in the waist.  · You have trouble getting rings on or off your fingers.  Shortness of breath  · You have to breathe harder even when youre doing your normal activities or when youre resting.  · You are short of breath walking up stairs or even short distances.  · You wake up at night short of breath or coughing.  · You need to use more pillows or sit up to sleep.  · You wake up tired or restless.  Other warning signs  · You feel weaker, dizzy, or more tired.  · You have chest pain or changes in your heartbeat.  · You have a cough that wont go away.  · You cant remember things or dont feel like eating.  Tracking your weight  Gaining weight is often the first warning sign that heart failure is getting worse. Gaining even a few pounds can be a sign that your body is retaining excess water and salt. Weighing yourself each day in the morning after you urinate and before you eat, is the best way to know if you're retaining water. Get a scale that is easy to read and make sure you wear the same clothes and use the same scale every time you weigh. Your healthcare provider will show you how to track your weight. Call your doctor if you gain more than 2 pounds in 1 day, 5 pounds in 1 week, or whatever weight gain you were told to report by your doctor. This is often a sign of worsening heart failure and needs to be evaluated and treated before it compromises your breathing. Your doctor will tell you what to do next.    Date Last Reviewed: 3/15/2016  © 9457-0131 [employer]. [address] [place] [address], [place] [address]. All rights reserved. This information is not intended  as a substitute for professional medical care. Always follow your healthcare professional's instructions.              [facility] Sign-Up     Activating your [facility] account is as easy as 1-2-3!     1) Visit [email or web address], select Sign Up Now, enter this activation code and your date of birth, then select Next.  [email or web address]  Expires: 3/27/2017 12:32 PM      2) Create a username and password to use when you visit [facility] in the future and select a security question in case you lose your password and select Next.    3) Enter your e-mail address and click Sign Up!    Additional Information  If you have questions, please e-mail [email or web address] or call [phone] to talk to our [facility] staff. Remember, [facility] is NOT to be used for urgent needs. For medical emergencies, dial 911.          [facility] complies with applicable Federal civil rights laws and does not discriminate on the basis of race, color, national origin, age, disability, or sex.

## 2017-03-13 PROBLEM — I10 ESSENTIAL HYPERTENSION: Status: ACTIVE | Noted: 2017-03-13

## 2017-03-13 PROBLEM — I50.43 ACUTE ON CHRONIC COMBINED SYSTOLIC AND DIASTOLIC CHF, NYHA CLASS 4: Status: ACTIVE | Noted: 2017-03-13

## 2017-03-13 PROBLEM — I25.10 CORONARY ARTERY DISEASE INVOLVING NATIVE CORONARY ARTERY: Status: ACTIVE | Noted: 2017-03-13

## 2017-03-13 LAB
ALBUMIN SERPL BCP-MCNC: 2.9 G/DL
ALLENS TEST: ABNORMAL
ALP SERPL-CCNC: 198 U/L
ALT SERPL W/O P-5'-P-CCNC: 26 U/L
ANION GAP SERPL CALC-SCNC: 11 MMOL/L
ANION GAP SERPL CALC-SCNC: 6 MMOL/L
ANISOCYTOSIS BLD QL SMEAR: SLIGHT
AORTIC VALVE REGURGITATION: ABNORMAL
AST SERPL-CCNC: 25 U/L
BASOPHILS # BLD AUTO: 0.07 K/UL
BASOPHILS NFR BLD: 0.8 %
BILIRUB SERPL-MCNC: 1.4 MG/DL
BUN SERPL-MCNC: 16 MG/DL
BUN SERPL-MCNC: 17 MG/DL
BURR CELLS BLD QL SMEAR: ABNORMAL
CALCIUM SERPL-MCNC: 8.2 MG/DL
CALCIUM SERPL-MCNC: 8.6 MG/DL
CHLORIDE SERPL-SCNC: 101 MMOL/L
CHLORIDE SERPL-SCNC: 101 MMOL/L
CO2 SERPL-SCNC: 26 MMOL/L
CO2 SERPL-SCNC: 32 MMOL/L
CREAT SERPL-MCNC: 1 MG/DL
CREAT SERPL-MCNC: 1 MG/DL
DELSYS: ABNORMAL
DIASTOLIC DYSFUNCTION: YES
DIFFERENTIAL METHOD: ABNORMAL
EOSINOPHIL # BLD AUTO: 0.4 K/UL
EOSINOPHIL NFR BLD: 4.6 %
ERYTHROCYTE [DISTWIDTH] IN BLOOD BY AUTOMATED COUNT: 16.5 %
EST. GFR  (AFRICAN AMERICAN): >60 ML/MIN/1.73 M^2
EST. GFR  (AFRICAN AMERICAN): >60 ML/MIN/1.73 M^2
EST. GFR  (NON AFRICAN AMERICAN): >60 ML/MIN/1.73 M^2
EST. GFR  (NON AFRICAN AMERICAN): >60 ML/MIN/1.73 M^2
ESTIMATED PA SYSTOLIC PRESSURE: 46.36
GLUCOSE SERPL-MCNC: 105 MG/DL
GLUCOSE SERPL-MCNC: 131 MG/DL
HCO3 UR-SCNC: 27.1 MMOL/L (ref 24–28)
HCT VFR BLD AUTO: 31.7 %
HGB BLD-MCNC: 10.5 G/DL
HYPOCHROMIA BLD QL SMEAR: ABNORMAL
LYMPHOCYTES # BLD AUTO: 2.3 K/UL
LYMPHOCYTES NFR BLD: 26.4 %
MAGNESIUM SERPL-MCNC: 1.6 MG/DL
MAGNESIUM SERPL-MCNC: 2.5 MG/DL
MCH RBC QN AUTO: 26.3 PG
MCHC RBC AUTO-ENTMCNC: 33.1 %
MCV RBC AUTO: 79 FL
MITRAL VALVE REGURGITATION: ABNORMAL
MONOCYTES # BLD AUTO: 0.9 K/UL
MONOCYTES NFR BLD: 10.3 %
NEUTROPHILS # BLD AUTO: 4.9 K/UL
NEUTROPHILS NFR BLD: 57.9 %
PCO2 BLDA: 41.7 MMHG (ref 35–45)
PH SMN: 7.42 [PH] (ref 7.35–7.45)
PLATELET # BLD AUTO: 197 K/UL
PLATELET BLD QL SMEAR: ABNORMAL
PMV BLD AUTO: 9.5 FL
PO2 BLDA: 26 MMHG (ref 40–60)
POC BE: 3 MMOL/L
POC SATURATED O2: 50 % (ref 95–100)
POC TCO2: 28 MMOL/L (ref 24–29)
POTASSIUM SERPL-SCNC: 3.5 MMOL/L
POTASSIUM SERPL-SCNC: 3.7 MMOL/L
PROT SERPL-MCNC: 6.5 G/DL
RBC # BLD AUTO: 3.99 M/UL
RETIRED EF AND QEF - SEE NOTES: 15 (ref 55–65)
SAMPLE: ABNORMAL
SITE: ABNORMAL
SODIUM SERPL-SCNC: 138 MMOL/L
SODIUM SERPL-SCNC: 139 MMOL/L
TRICUSPID VALVE REGURGITATION: ABNORMAL
WBC # BLD AUTO: 8.56 K/UL

## 2017-03-13 PROCEDURE — C8929 TTE W OR WO FOL WCON,DOPPLER: HCPCS

## 2017-03-13 PROCEDURE — 99233 SBSQ HOSP IP/OBS HIGH 50: CPT | Mod: ,,, | Performed by: INTERNAL MEDICINE

## 2017-03-13 PROCEDURE — 93005 ELECTROCARDIOGRAM TRACING: CPT

## 2017-03-13 PROCEDURE — 93306 TTE W/DOPPLER COMPLETE: CPT | Mod: 26,,, | Performed by: INTERNAL MEDICINE

## 2017-03-13 PROCEDURE — 76937 US GUIDE VASCULAR ACCESS: CPT

## 2017-03-13 PROCEDURE — 02HV33Z INSERTION OF INFUSION DEVICE INTO SUPERIOR VENA CAVA, PERCUTANEOUS APPROACH: ICD-10-PCS | Performed by: INTERNAL MEDICINE

## 2017-03-13 PROCEDURE — C1751 CATH, INF, PER/CENT/MIDLINE: HCPCS

## 2017-03-13 PROCEDURE — 25000003 PHARM REV CODE 250: Performed by: INTERNAL MEDICINE

## 2017-03-13 PROCEDURE — 36569 INSJ PICC 5 YR+ W/O IMAGING: CPT

## 2017-03-13 PROCEDURE — 20000000 HC ICU ROOM

## 2017-03-13 PROCEDURE — 85025 COMPLETE CBC W/AUTO DIFF WBC: CPT

## 2017-03-13 PROCEDURE — 80048 BASIC METABOLIC PNL TOTAL CA: CPT

## 2017-03-13 PROCEDURE — 82803 BLOOD GASES ANY COMBINATION: CPT

## 2017-03-13 PROCEDURE — 80053 COMPREHEN METABOLIC PANEL: CPT

## 2017-03-13 PROCEDURE — 63600175 PHARM REV CODE 636 W HCPCS: Performed by: INTERNAL MEDICINE

## 2017-03-13 PROCEDURE — 83735 ASSAY OF MAGNESIUM: CPT | Mod: 91

## 2017-03-13 PROCEDURE — 36415 COLL VENOUS BLD VENIPUNCTURE: CPT

## 2017-03-13 RX ORDER — SODIUM CHLORIDE 0.9 % (FLUSH) 0.9 %
10 SYRINGE (ML) INJECTION EVERY 6 HOURS
Status: DISCONTINUED | OUTPATIENT
Start: 2017-03-13 | End: 2017-03-17 | Stop reason: HOSPADM

## 2017-03-13 RX ORDER — MAGNESIUM SULFATE HEPTAHYDRATE 40 MG/ML
2 INJECTION, SOLUTION INTRAVENOUS ONCE
Status: COMPLETED | OUTPATIENT
Start: 2017-03-13 | End: 2017-03-13

## 2017-03-13 RX ORDER — POTASSIUM CHLORIDE 29.8 MG/ML
40 INJECTION INTRAVENOUS ONCE
Status: COMPLETED | OUTPATIENT
Start: 2017-03-13 | End: 2017-03-13

## 2017-03-13 RX ORDER — SODIUM CHLORIDE 0.9 % (FLUSH) 0.9 %
10 SYRINGE (ML) INJECTION
Status: DISCONTINUED | OUTPATIENT
Start: 2017-03-13 | End: 2017-03-17 | Stop reason: HOSPADM

## 2017-03-13 RX ADMIN — CLONAZEPAM 0.5 MG: 0.5 TABLET ORAL at 08:03

## 2017-03-13 RX ADMIN — GABAPENTIN 800 MG: 100 CAPSULE ORAL at 10:03

## 2017-03-13 RX ADMIN — MAGNESIUM SULFATE IN WATER 2 G: 40 INJECTION, SOLUTION INTRAVENOUS at 06:03

## 2017-03-13 RX ADMIN — SODIUM CHLORIDE, PRESERVATIVE FREE 3 ML: 5 INJECTION INTRAVENOUS at 01:03

## 2017-03-13 RX ADMIN — CLOPIDOGREL 75 MG: 75 TABLET, FILM COATED ORAL at 10:03

## 2017-03-13 RX ADMIN — SPIRONOLACTONE 25 MG: 25 TABLET, FILM COATED ORAL at 10:03

## 2017-03-13 RX ADMIN — SODIUM CHLORIDE, PRESERVATIVE FREE 10 ML: 5 INJECTION INTRAVENOUS at 12:03

## 2017-03-13 RX ADMIN — MIRTAZAPINE 45 MG: 15 TABLET, FILM COATED ORAL at 08:03

## 2017-03-13 RX ADMIN — GABAPENTIN 800 MG: 100 CAPSULE ORAL at 08:03

## 2017-03-13 RX ADMIN — CLONAZEPAM 0.5 MG: 0.5 TABLET ORAL at 10:03

## 2017-03-13 RX ADMIN — SODIUM CHLORIDE, PRESERVATIVE FREE 10 ML: 5 INJECTION INTRAVENOUS at 06:03

## 2017-03-13 RX ADMIN — ASPIRIN 81 MG: 81 TABLET, COATED ORAL at 10:03

## 2017-03-13 RX ADMIN — SODIUM CHLORIDE, PRESERVATIVE FREE 3 ML: 5 INJECTION INTRAVENOUS at 05:03

## 2017-03-13 RX ADMIN — POTASSIUM CHLORIDE 40 MEQ: 400 INJECTION, SOLUTION INTRAVENOUS at 06:03

## 2017-03-13 NOTE — PROGRESS NOTES
"Hospital Day: 2    Subjective:  Interval History: Pt very drowsy, groaning this morning. Per wife this has been his baseline for days now. C/o pain all over. Follows commands.    Scheduled Meds:   aspirin  81 mg Oral Daily    clonazePAM  0.5 mg Oral BID    clopidogrel  75 mg Oral Daily    gabapentin  800 mg Oral BID    lisinopril  2.5 mg Oral BID    magnesium sulfate IVPB  2 g Intravenous Once    mirtazapine  45 mg Oral QHS    sodium chloride 0.9%  3 mL Intravenous Q8H    spironolactone  25 mg Oral Daily     Continuous Infusions:   DOBUTamine 5 mcg/kg/min (03/13/17 0500)    furosemide (LASIX) 1 mg/mL infusion (non-titrating) 10 mg/hr (03/13/17 0500)     PRN Meds:acetaminophen, ondansetron    Objective:  Vital signs in last 24 hours:   Temp:  [97.8 °F (36.6 °C)-98.4 °F (36.9 °C)] 97.9 °F (36.6 °C)  Pulse:  [] 102  Resp:  [15-51] 21  SpO2:  [94 %-100 %] 94 %  BP: ()/(52-78) 82/52    Intake/Output last 3 shifts:  I/O last 3 completed shifts:  In: 439 [P.O.:300; I.V.:139]  Out: 525 [Urine:525]  Intake/Output this shift:       Physical Exam:  BP (!) 113/58 (BP Location: Right arm, BP Method: Automatic)  Pulse 102  Temp 98.2 °F (36.8 °C) (Oral)   Resp (!) 21  Ht 5' 11" (1.803 m)  Wt 76.1 kg (167 lb 12.3 oz)  SpO2 (!) 94%  BMI 23.4 kg/m2  General appearance: mild distress  Neck: no JVD and supple, symmetrical, trachea midline  Lungs: clear to auscultation bilaterally  Heart: regular rate and rhythm, S1, S2 normal, no murmur, click, rub or gallop  Abdomen: hypoactive BS, diffuse tenderness, no rebound tenderness  Extremities: extremities normal, atraumatic, no cyanosis or edema, warm on exam  Neurologic: Grossly normal      Lab Review  Lab Results   Component Value Date     03/13/2017    K 3.7 03/13/2017     03/13/2017    CO2 26 03/13/2017    BUN 17 03/13/2017    CREATININE 1.0 03/13/2017    CALCIUM 8.2 (L) 03/13/2017     Lab Results   Component Value Date    WBC 8.56 03/13/2017    " HGB 10.5 (L) 03/13/2017    HCT 31.7 (L) 03/13/2017    MCV 79 (L) 03/13/2017     03/13/2017        Assessment/Plan:  46 y.o. gentleman with PMH of ICM (EF=10%), CAD (s/p PCI), HLD, HTN, depression and schizophrenia that presents from OSH for management of ADHF.    Acute on Chronic Systolic HF  -Cont home  5. On Lasix drip 10 mg/hr.  -PICC in [place] so will get new line then obtain CVP and SVO2.  -GDMT: Cont ACE, Aldactone. No BB due to ADHF/ on   -ICD in place  -Per records from last hospitalization- pt was not worked up for advanced options due to financial issues, schizophrenia, and chronic pain issues.  - 2D echo w/ CFD pending    CAD s/p PCI  - Continue statin, DAPT      Hx of Depression / Anxiety / Schizophrenia  -Continue home mirtazapine and klonopin  -On Invega injections as an outpt    Active Hospital Problems    Diagnosis  POA    Heart failure [I50.9]  Yes      Resolved Hospital Problems    Diagnosis Date Resolved POA   No resolved problems to display.

## 2017-03-13 NOTE — PROCEDURES
"Urbano Hensley is a 46 y.o. male patient.    Temp: 97.9 °F (36.6 °C) (03/13/17 0300)  Pulse: 102 (03/13/17 0600)  Resp: (!) 21 (03/13/17 0600)  BP: (!) 82/52 (03/13/17 0600)  SpO2: (!) 94 % (03/13/17 0600)  Weight: 76.1 kg (167 lb 12.3 oz) (03/12/17 1930)  Height: 5' 11" (180.3 cm) (03/12/17 1930)    PICC  Date/Time: 3/13/2017 10:12 AM  Performed by: RUSSELL COLE  Consent Done: Yes  Time out: Immediately prior to procedure a time out was called to verify the correct patient, procedure, equipment, support staff and site/side marked as required  Indications: med administration and vascular access  Anesthesia: local infiltration  Local anesthetic: lidocaine 1% without epinephrine  Anesthetic Total (mL): 3  Preparation: skin prepped with ChloraPrep  Skin prep agent dried: skin prep agent completely dried prior to procedure  Sterile barriers: all five maximum sterile barriers used - cap, mask, sterile gown, sterile gloves, and large sterile sheet  Hand hygiene: hand hygiene performed prior to central venous catheter insertion  Location details: left basilic  Catheter type: double lumen  Catheter size: 5 Fr  Catheter Length: 40cm    Ultrasound guidance: yes  Vessel Caliber: medium and patent, compressibility normal  Needle advanced into vessel with real time Ultrasound guidance.  Guidewire confirmed in vessel.  Image recorded and saved.  Sterile sheath used.  Number of attempts: 1  Post-procedure: blood return through all ports, chlorhexidine patch and sterile dressing applied  Technical procedures used: 3CG  Specimens: No  Implants: No  Assessment: placement verified by x-ray        Donna Reyez  3/13/2017  "

## 2017-03-13 NOTE — H&P
Cardiology History & Physical  Attending Physician: Ghulam Keita MD  Chief Complaint: ADHF     HPI:   46 y.o. gentleman with PMH of ICM (EF=10%), CAD (s/p PCI), HLD, HTN, depression and schizophrenia that presents from OSH for management of acute on chronic HFrEF.    Patient was admitted recently on 2/2/17 with cardiogenic shock where he had an IABP placed and improved with IABP,  and diuresis. He was considered for advanced options but was turned down due to his social and psych issues. He was discharged on home  and PO diuretics on on 2/10/17 with plans to follow-up with his local cardiologist, however, since discharge, he reports he has been making minimal urine and has been having worsening abdominal distention and ENGLAND.     He presented to OSH yesterday (3/11) with worsening SOB, ENGLAND, orthopnea, PND and abdominal distention and admitted for diuresis. Patient's labs showed normal renal function and transaminases at the time. He was transferred for advanced options consideration.    Patient denies chest pain, palpitations, syncope, lightheadedness and LE edema.    ROS:    Constitution: Negative for fever, chills, weight loss or gain.   HENT: Negative for sore throat, rhinorrhea, or headache.  Eyes: Negative for blurred or double vision.   Cardiovascular: See above  Pulmonary: Positive for SOB   Gastrointestinal: Negative for abdominal pain, nausea, vomiting, or diarrhea.   : Negative for dysuria.   Neurological: Negative for focal weakness or sensory changes.  PMH:     Past Medical History:   Diagnosis Date    Acute on chronic systolic congestive heart failure 2/4/2017    Bipolar 1 disorder     Coronary artery disease     Coronary artery disease involving native coronary artery of native heart without angina pectoris 2/4/2017    Ischemic cardiomyopathy 2/4/2017    Paranoid schizophrenia      Past Surgical History:   Procedure Laterality Date    CARDIAC SURGERY      SHOULDER SURGERY        Allergies:   Review of patient's allergies indicates:  No Known Allergies  Medications:     Current Facility-Administered Medications on File Prior to Encounter   Medication Dose Route Frequency Provider Last Rate Last Dose    [COMPLETED] furosemide injection 80 mg  80 mg Intravenous ED 1 Time Waldo Calvillo MD   80 mg at 03/11/17 2301    [COMPLETED] potassium bicarbonate disintegrating tablet 50 mEq  50 mEq Oral Once FÉLIX Lyon NP   50 mEq at 03/12/17 1627    [DISCONTINUED] DOBUTamine 500mg in D5W 250mL infusion (premix) (NON-TITRATING)  5 mcg/kg/min Intravenous Continuous Waldo Calvillo MD        [DISCONTINUED] furosemide (LASIX) 250 mg in sodium chloride 0.9% 250 mL infusion  10 mg/hr Intravenous Continuous Waldo Calvillo MD 10 mL/hr at 03/12/17 0006 10 mg/hr at 03/12/17 0006    [DISCONTINUED] nicotine 7 mg/24 hr 1 patch  1 patch Transdermal Daily FÉLIX Lyon NP   1 patch at 03/12/17 1339     Current Outpatient Prescriptions on File Prior to Encounter   Medication Sig Dispense Refill    aspirin (ECOTRIN) 81 MG EC tablet Take 1 tablet (81 mg total) by mouth once daily.  0    clonazePAM (KLONOPIN) 0.5 MG tablet Take 0.5 mg by mouth 2 (two) times daily.      clopidogrel (PLAVIX) 75 mg tablet Take 75 mg by mouth once daily.      DOBUTamine (DOBUTREX) 500 mg/250 mL (2,000 mcg/mL) Inject 359.5 mcg/min into the vein continuous. 250 mL     furosemide (LASIX) 80 MG tablet Take 1 tablet (80 mg total) by mouth once daily. 90 tablet 11    gabapentin (NEURONTIN) 800 MG tablet Take 800 mg by mouth 2 (two) times daily.      hydrocodone-acetaminophen 5-325mg (NORCO) 5-325 mg per tablet Take 2 tablets by mouth every 6 (six) hours as needed for Pain (breakthrough pain not controlled by other analgesics.  May cause sedation.). Take 1-2 tablets 24 tablet 0    lisinopril (PRINIVIL,ZESTRIL) 2.5 MG tablet Take 1 tablet (2.5 mg total) by mouth 2 (two) times daily. 180 tablet 3    mirtazapine  (REMERON) 45 MG tablet Take 45 mg by mouth every evening.      oxycodone (ROXICODONE) 5 MG immediate release tablet Take 1 tablet (5 mg total) by mouth every 6 (six) hours as needed. 20 tablet 0    pramoxine-hydrocortisone (PROCTOCREAM-HC) 1-1 % rectal cream Place rectally 3 (three) times daily. 1 Tube 0    spironolactone (ALDACTONE) 25 MG tablet Take 1 tablet (25 mg total) by mouth once daily. 90 tablet 11       Inpatient Medications   Continuous Infusions:   DOBUTamine 5 mcg/kg/min (03/12/17 2042)    furosemide (LASIX) 1 mg/mL infusion (non-titrating) 10 mg/hr (03/12/17 2042)     Scheduled Meds:   alteplase  2 mg Intra-Catheter Once    [START ON 3/13/2017] aspirin  81 mg Oral Daily    clonazePAM  0.5 mg Oral BID    [START ON 3/13/2017] clopidogrel  75 mg Oral Daily    gabapentin  800 mg Oral BID    lisinopril  2.5 mg Oral BID    mirtazapine  45 mg Oral QHS    sodium chloride 0.9%  3 mL Intravenous Q8H    [START ON 3/13/2017] spironolactone  25 mg Oral Daily     PRN Meds:acetaminophen, ondansetron     Social History:     Social History   Substance Use Topics    Smoking status: Current Every Day Smoker     Packs/day: 0.50     Years: 15.00     Types: Cigarettes    Smokeless tobacco: Not on file    Alcohol use No     Family History:     Family History   Problem Relation Age of Onset    No Known Problems Mother     Heart disease Father      Physical Exam:     Vitals:  Temp:  [97.6 °F (36.4 °C)-98.4 °F (36.9 °C)]   Pulse:  []   Resp:  [16-51]   BP: ()/(64-78)   SpO2:  [94 %-100 %]  on RA I/O's:    Intake/Output Summary (Last 24 hours) at 03/12/17 2104  Last data filed at 03/12/17 2000   Gross per 24 hour   Intake             15.7 ml   Output                0 ml   Net             15.7 ml      General: Mild discomfort  Neuro: Grossly intact  HEENT: EOM intact, ALVINA, no conjunctival injection, no pallor  Neck: No anterior or posterior lymphadenopathy. JVD 10-12 cm. No carotid bruits.    Cardiac: S1, S2, no murmurs, rubs, or gallops. Pulses 2+, regular.   Pulmonary: Decreased BS In RLL.  Abdominal: Soft, distended, + fluid wave, TTP. Unable to palpate mass or hepatosplenomegaly. No rebound or rigidity. Bowel sounds present.   Extremities: No clubbing, cyanosis, or edema.   Skin: No bruising or rash    Labs:       Recent Labs  Lab 03/11/17 1844 03/12/17  0445    137   K 3.3* 3.1*   CL 99 101   CO2 28 26   BUN 14 14   CREATININE 1.00 0.90       Recent Labs  Lab 03/11/17 1844 03/12/17  0445   AST 40 33   ALT 44 42   ALKPHOS 183* 188*   BILITOT 1.5* 1.4*   ALBUMIN 3.6 3.7       Recent Labs  Lab 03/11/17  1844   TROPONINI <0.012   *      Recent Labs  Lab 03/11/17 1844 03/12/17 0445   WBC 9.50 9.70   HGB 10.5* 10.7*   HCT 32.7* 32.7*    194   GRAN 73.2*  6.9 62.7  6.1       Recent Labs  Lab 03/12/17 2022   INR 1.3*     No results found for: CHOL, HDL, LDLCALC, TRIG  No results found for: HGBA1C     Micro:  Blood Cultures  No results found for: LABBLOO  Urine Cultures  No results found for: LABURIN    Imaging:    CXR: Small R pleural effusion, cardiomegaly     EF   Date Value Ref Range Status   02/04/2017 10 (A) 55 - 65      EKG: NSR    Telemetry: Sinus tach    Assessment:    46 y.o. gentleman with PMH of ICM (EF=10%), CAD (s/p PCI), HLD, HTN, depression and schizophrenia that presents from OSH for management of acute on chronic HFrEF.     Plan:   Acute on chronic HFrEF  - Diuresis with Lasix gtt at 10 mg / hr, continue  at 5 mcg/kg/min  - Labs not suggestive of cardiogenic shock at this time, MVO2 off PICC line was in the low 60s  - 2D echo w/ CFD in AM  - Strict I/Os, fluid restriction  - Will need to have discussion about long-term options as patient reports not feeling well since he was discharged and not candidate for advanced options      CAD s/p PCI  - Continue statin, DAPT     Hx of Depression / Anxiety  - Continue home mirtazapine and klonopin     Discussed with  on-call staff, Dr. Keita    Signed:  Gerard Radford MD, MPH  Cardiovascular Disease Fellow, PGY-4  Pager: [phone]  3/12/2017 9:04 PM

## 2017-03-13 NOTE — PROGRESS NOTES
RN called [facility] to report insertion of new PICC and radiology report confirming placement. Per MD, ok to discontinue LARISSA PICC and to use ARGENIS PICC.

## 2017-03-13 NOTE — PROGRESS NOTES
"Admit Note     Met with patient and significant other to assess needs. Pt's significant other answered most of social workers questions.   Patient is a 46 y.o. single male, admitted  for heart failure. Pt has a PMH ofr cardiogenic shock.  Per pt's significant other pt will be worked up for an LVAD.   Per medical record and report from significant other pt has a significant mental health history.    Patient admitted from transfer on 3/12/2017 .  At this time, patient presents as alert and oriented x 4.  At this time, patients caregiver presents as alert and oriented x 4, pleasant and good eye contact.    Household/Family Systems     Patient resides with patient's significant other, at     [address] [place] [address]    1.5 hours from [facility]    Support system includes significant other, mother( Janette) , sister (Nancy)  And other extended family.    Patient does not have dependents that are need of being cared for.  The pt does not have any children.  The pt's significant other reports she has seven children, however they are all adults and live in their own homes.      Patients primary caregiver is Gregoria Webb, patients significant other for the last three years.   Pt and significant other share one cell phone"  [phone]  Additional emergency contact:  Lori (mother) [phone]  .    During admission, patient's caregiver plans to stay in patient's room.  Confirmed patient and patients caregivers do not have access to reliable transportation. Pt's significant other reports the pt does have a truck,however she is not sure how many times it will be able to "make it "  to [place].      Cognitive Status/Learning     Patient reports reading ability as 12th grade and states patient does not have difficulty with reading, writing, seeing, hearing, comprehension, learning and memory.  Patient's significant other reports patient learns best by one on one.   Needed: No.   Highest " education level: High School (9-12) or GED  The pt and significant other are in taking on line classes for business school.      Vocation/Disability   .  Working for Income: No  If no, reason not working: Disability  Patient's significant other reports the pt  is disabled due to mental illness in 2014.  Prior to disability the pt was employed as an .    Pt's significant other is not working outside of the home.  Pt's significant other reports pt is not able to be left on his own and she needs to stay at home to care for pt.  Pt's significant other reports they do not qualify for the Medicaid waver program.        Adherence     Patient's significant other reports pt has  a high level of adherence to patients health care regimen because of the support she provides.  Pt's significant other reports the pt is not capable of organizing his own medications.    Adherence counseling and education provided. Patient verbalizes understanding.    Substance Use    Patient's significant other reports the following substance usage.    Tobacco: The pt has not been smoking for the last month. The pt wears the patch for smoking support.  The pt used to smoke 1ppd since the age of 12.  The pt's significant other reports she is a significant smoker, but smokes outside of the home.   Alcohol: none currently.  Pt's significant other reports pt was an alcoholic, but has not had alcohol for the last 15 years.  Illicit Drugs/Non-prescribed Medications: none, patient denies any use.  Patient states clear understanding of the potential impact of substance use.  Substance abstinence/cessation counseling, education and resources provided and reviewed.     Services Utilizing/ADLS    Infusion Service: Prior to admission, patient utilizing? yes [facility] [phone], fax [phone] for .  Pt has home pump in room.  Pt would like to continue with this infusion company when discharged.   Home Health: Prior to admission, patient  utilizing? yes [facility]. for nursing 2-3 x per week, PT and OT 2x per week and social work services as needed. [phone], fax [phone].  Pt would like same HH co to resume when discharged. Pt reports HH is in the process of getting an three prong cane and BSC.   DME: Prior to admission, no  Pulmonary/Cardiac Rehab: Prior to admission, no  Dialysis:  Prior to admission, no  Transplant Specialty Pharmacy:  Prior to admission, no.    Prior to admission, patient reports patient was somewhat  independent with ADLS and was not driving. Pt's significant other reports the pt has SOB with bathing and dressing. Pt reports he is only able to move about the house.  Pt's significant other drives. However transportation may be an issue in the future.  Patient reports patient is not able to care for self at this time due to compromised medical condition (as documented in medical record) and physical weakness..  Patient indicates a willingness to care for self once medically cleared to do so.    Insurance/Medications    Insured by   Payor/Plan Subscr  Sex Relation Sub. Ins. ID Effective Group Num   1. [place] - [place]* NATASHA BELL 1970 Male  [id number]                                    [address]   2. [place] NATASHA BELL 1970 Male  [id number]* 2/1/15                                    [address] [address]      Primary Insurance (for UNOS reporting): Public Insurance - Medicare FFS (Fee For Service)  Secondary Insurance (for UNOS reporting): Public Insurance - Medicaid    Patient reports patient is able to obtain and afford medications at this time and at time of discharge.    Living Will/Healthcare Power of     Patient states patient does not have a LW and/or HCPA.   provided education regarding LW and HCPA and the completion of forms.    Coping/Mental Health    Patient is coping adequately with the aid of  family members. .  Patient and significant other indicates  mental health difficulties. Per Gregroia the pt's significant other the pt was dx in 2005 with multiple mental health issues.  Dx include, bipolar disorder, paranoid schizophrenia, ADHD, and antisocial personality disorder.  The pt see's his psychiatrist, Dr. Salazar ([place])  every three months.  Pt's last visit with Dr. Salazar was last week where pt received an Invega shot.  Pt's significant other reports this shot is good for the next three months.   Pt also takes mirtazapine and klonopin.  The pt's significant other reports she takes care of the pt's medication management.  Pt reports he is very tired and in some discomfort. Pt had fallen asleep by the end of assessment.  The pt's significant other reports the pt is coping adequately at this time. Pt's significant other will stay with pt until discharged.  General support provided.     Discharge Planning    At time of discharge, patient plans to return to patient's home under the care of self and significant other.  Patients significant other's mother will transport patient.  Per rounds today, expected discharge date has not been medically determined at this time. Patient and patients caregiver  verbalize understanding and are involved in treatment planning and discharge process.    Additional Concerns    Patient is being followed for needs, education, resources, information, emotional support, supportive counseling, and for supportive and skilled discharge plan of care.  providing ongoing psychosocial support, education, resources and d/c planning as needed.  SW remains available. Patient's caregiver verbalizes understanding and agreement with information reviewed,  availability and how to access available resources as needed. Patient verbalizes understanding and agreement with information reviewed, social work availability, and how to access available resources as needed.

## 2017-03-13 NOTE — PLAN OF CARE
Problem: Patient Care Overview  Goal: Individualization & Mutuality  Outcome: Ongoing (interventions implemented as appropriate)  No acute events throughout night, Lasix and Dobutamine infusing. SvO2 61. Echo today. VS and assessment per flow sheet, patient progressing towards goals as tolerated, plan of care reviewed with Urbano Hensley and family, all concerns addressed, will continue to monitor.

## 2017-03-13 NOTE — PROCEDURES
"Urbano Hensley is a 46 y.o. male patient.    Temp: 97.9 °F (36.6 °C) (03/13/17 0300)  Pulse: 102 (03/13/17 0600)  Resp: (!) 21 (03/13/17 0600)  BP: (!) 82/52 (03/13/17 0600)  SpO2: (!) 94 % (03/13/17 0600)  Weight: 76.1 kg (167 lb 12.3 oz) (03/12/17 1930)  Height: 5' 11" (180.3 cm) (03/12/17 1930)    <PICC>    Donna Reyez  3/13/2017  "

## 2017-03-14 LAB
ALBUMIN SERPL BCP-MCNC: 3 G/DL
ALLENS TEST: ABNORMAL
ALP SERPL-CCNC: 204 U/L
ALT SERPL W/O P-5'-P-CCNC: 24 U/L
ANION GAP SERPL CALC-SCNC: 12 MMOL/L
AST SERPL-CCNC: 22 U/L
BASOPHILS # BLD AUTO: 0.06 K/UL
BASOPHILS NFR BLD: 0.7 %
BILIRUB SERPL-MCNC: 1.6 MG/DL
BUN SERPL-MCNC: 18 MG/DL
CALCIUM SERPL-MCNC: 8.7 MG/DL
CHLORIDE SERPL-SCNC: 102 MMOL/L
CO2 SERPL-SCNC: 25 MMOL/L
CREAT SERPL-MCNC: 1.2 MG/DL
DELSYS: ABNORMAL
DIFFERENTIAL METHOD: ABNORMAL
EOSINOPHIL # BLD AUTO: 0.4 K/UL
EOSINOPHIL NFR BLD: 4.3 %
ERYTHROCYTE [DISTWIDTH] IN BLOOD BY AUTOMATED COUNT: 16.9 %
EST. GFR  (AFRICAN AMERICAN): >60 ML/MIN/1.73 M^2
EST. GFR  (NON AFRICAN AMERICAN): >60 ML/MIN/1.73 M^2
GLUCOSE SERPL-MCNC: 114 MG/DL
HCO3 UR-SCNC: 27.4 MMOL/L (ref 24–28)
HCT VFR BLD AUTO: 33.7 %
HGB BLD-MCNC: 10.8 G/DL
LYMPHOCYTES # BLD AUTO: 2.1 K/UL
LYMPHOCYTES NFR BLD: 22.6 %
MAGNESIUM SERPL-MCNC: 2.2 MG/DL
MCH RBC QN AUTO: 26.4 PG
MCHC RBC AUTO-ENTMCNC: 32 %
MCV RBC AUTO: 82 FL
MODE: ABNORMAL
MONOCYTES # BLD AUTO: 0.6 K/UL
MONOCYTES NFR BLD: 6.5 %
NEUTROPHILS # BLD AUTO: 6 K/UL
NEUTROPHILS NFR BLD: 65.8 %
PCO2 BLDA: 40.4 MMHG (ref 35–45)
PH SMN: 7.44 [PH] (ref 7.35–7.45)
PLATELET # BLD AUTO: 215 K/UL
PMV BLD AUTO: 10.2 FL
PO2 BLDA: 31 MMHG (ref 40–60)
POC BE: 3 MMOL/L
POC SATURATED O2: 62 % (ref 95–100)
POC TCO2: 29 MMOL/L (ref 24–29)
POTASSIUM SERPL-SCNC: 3.8 MMOL/L
PROT SERPL-MCNC: 6.6 G/DL
RBC # BLD AUTO: 4.09 M/UL
SAMPLE: ABNORMAL
SITE: ABNORMAL
SODIUM SERPL-SCNC: 139 MMOL/L
SP02: 92
WBC # BLD AUTO: 9.09 K/UL

## 2017-03-14 PROCEDURE — 20600001 HC STEP DOWN PRIVATE ROOM

## 2017-03-14 PROCEDURE — 25000003 PHARM REV CODE 250: Performed by: INTERNAL MEDICINE

## 2017-03-14 PROCEDURE — 85025 COMPLETE CBC W/AUTO DIFF WBC: CPT

## 2017-03-14 PROCEDURE — 83735 ASSAY OF MAGNESIUM: CPT

## 2017-03-14 PROCEDURE — 80053 COMPREHEN METABOLIC PANEL: CPT

## 2017-03-14 PROCEDURE — 25000003 PHARM REV CODE 250: Performed by: PHYSICIAN ASSISTANT

## 2017-03-14 PROCEDURE — 82803 BLOOD GASES ANY COMBINATION: CPT

## 2017-03-14 PROCEDURE — 63600175 PHARM REV CODE 636 W HCPCS: Performed by: INTERNAL MEDICINE

## 2017-03-14 PROCEDURE — 99232 SBSQ HOSP IP/OBS MODERATE 35: CPT | Mod: ,,, | Performed by: INTERNAL MEDICINE

## 2017-03-14 PROCEDURE — 99900035 HC TECH TIME PER 15 MIN (STAT)

## 2017-03-14 RX ORDER — OXYCODONE HYDROCHLORIDE 5 MG/1
5 TABLET ORAL EVERY 6 HOURS PRN
Status: DISCONTINUED | OUTPATIENT
Start: 2017-03-14 | End: 2017-03-17 | Stop reason: HOSPADM

## 2017-03-14 RX ORDER — POTASSIUM CHLORIDE 750 MG/1
20 CAPSULE, EXTENDED RELEASE ORAL ONCE
Status: COMPLETED | OUTPATIENT
Start: 2017-03-14 | End: 2017-03-14

## 2017-03-14 RX ORDER — OXYCODONE HYDROCHLORIDE 5 MG/1
5 TABLET ORAL ONCE
Status: COMPLETED | OUTPATIENT
Start: 2017-03-14 | End: 2017-03-14

## 2017-03-14 RX ADMIN — POTASSIUM CHLORIDE 20 MEQ: 750 CAPSULE, EXTENDED RELEASE ORAL at 08:03

## 2017-03-14 RX ADMIN — FUROSEMIDE 10 MG/HR: 10 INJECTION, SOLUTION INTRAMUSCULAR; INTRAVENOUS at 09:03

## 2017-03-14 RX ADMIN — SPIRONOLACTONE 25 MG: 25 TABLET, FILM COATED ORAL at 08:03

## 2017-03-14 RX ADMIN — SODIUM CHLORIDE, PRESERVATIVE FREE 10 ML: 5 INJECTION INTRAVENOUS at 12:03

## 2017-03-14 RX ADMIN — ASPIRIN 81 MG: 81 TABLET, COATED ORAL at 08:03

## 2017-03-14 RX ADMIN — GABAPENTIN 800 MG: 100 CAPSULE ORAL at 08:03

## 2017-03-14 RX ADMIN — CLONAZEPAM 0.5 MG: 0.5 TABLET ORAL at 09:03

## 2017-03-14 RX ADMIN — CLONAZEPAM 0.5 MG: 0.5 TABLET ORAL at 08:03

## 2017-03-14 RX ADMIN — OXYCODONE HYDROCHLORIDE 5 MG: 5 TABLET ORAL at 09:03

## 2017-03-14 RX ADMIN — SODIUM CHLORIDE, PRESERVATIVE FREE 3 ML: 5 INJECTION INTRAVENOUS at 01:03

## 2017-03-14 RX ADMIN — CLOPIDOGREL 75 MG: 75 TABLET, FILM COATED ORAL at 08:03

## 2017-03-14 RX ADMIN — OXYCODONE HYDROCHLORIDE 5 MG: 5 TABLET ORAL at 03:03

## 2017-03-14 RX ADMIN — LISINOPRIL 2.5 MG: 2.5 TABLET ORAL at 10:03

## 2017-03-14 RX ADMIN — OXYCODONE HYDROCHLORIDE 5 MG: 5 TABLET ORAL at 08:03

## 2017-03-14 NOTE — PROGRESS NOTES
"Hospital Day: 3    Subjective:  Interval History: Pt is more alert this am. Feeling better today. Slept better last night. Abd swelling has improved    Scheduled Meds:   aspirin  81 mg Oral Daily    clonazePAM  0.5 mg Oral BID    clopidogrel  75 mg Oral Daily    gabapentin  800 mg Oral BID    lisinopril  2.5 mg Oral BID    mirtazapine  45 mg Oral QHS    potassium chloride  20 mEq Oral Once    sodium chloride 0.9%  10 mL Intravenous Q6H    sodium chloride 0.9%  3 mL Intravenous Q8H    spironolactone  25 mg Oral Daily     Continuous Infusions:   DOBUTamine 5 mcg/kg/min (03/14/17 0600)    furosemide (LASIX) 1 mg/mL infusion (non-titrating) 10 mg/hr (03/14/17 0600)     PRN Meds:acetaminophen, ondansetron, Flushing PICC Protocol **AND** sodium chloride 0.9% **AND** sodium chloride 0.9%    Objective:  Vital signs in last 24 hours:   Temp:  [97.4 °F (36.3 °C)-97.8 °F (36.6 °C)] 97.8 °F (36.6 °C)  Pulse:  [] 99  Resp:  [15-34] 24  SpO2:  [92 %-98 %] 94 %  BP: ()/(58-74) 86/67    Intake/Output last 3 shifts:  I/O last 3 completed shifts:  In: 1068 [P.O.:540; I.V.:528]  Out: 3125 [Urine:3125]  Intake/Output this shift:       Physical Exam:  BP (!) 86/67  Pulse 99  Temp 97.8 °F (36.6 °C) (Oral)   Resp (!) 24  Ht 5' 11" (1.803 m)  Wt 76.1 kg (167 lb 12.3 oz)  SpO2 (!) 94%  BMI 23.4 kg/m2  General appearance: mild distress  Neck: + JVD to jaw, supple, symmetrical, trachea midline  Lungs: clear to auscultation bilaterally  Heart: regular rate and rhythm, S1, S2 normal, no murmur, click, rub or gallop  Abdomen: soft, NT, mild distension  Extremities: extremities normal, atraumatic, no cyanosis or edema, warm on exam  Neurologic: Grossly normal      Lab Review  Lab Results   Component Value Date     03/14/2017    K 3.8 03/14/2017     03/14/2017    CO2 25 03/14/2017    BUN 18 03/14/2017    CREATININE 1.2 03/14/2017    CALCIUM 8.7 03/14/2017     Lab Results   Component Value Date    WBC 9.09 " 03/14/2017    HGB 10.8 (L) 03/14/2017    HCT 33.7 (L) 03/14/2017    MCV 82 03/14/2017     03/14/2017      2DE 3/13/17:  CONCLUSIONS     1 - Severe left ventricular enlargement.     2 - Severely depressed left ventricular systolic function (EF 15-20%).     3 - Left ventricular diastolic dysfunction.     4 - Moderately depressed right ventricular systolic function .     5 - Severe left atrial enlargement.     6 - Pulmonary hypertension. The estimated PA systolic pressure is 46 mmHg.     7 - Trivial to mild aortic regurgitation.     8 - Moderate mitral regurgitation.     9 - Moderate tricuspid regurgitation.     10 - Increased central venous pressure.     Assessment/Plan:  46 y.o. gentleman with PMH of ICM (EF=10%), CAD (s/p PCI), HLD, HTN, depression and schizophrenia that presents from OSH for management of ADHF.    Acute on Chronic Systolic HF  -Cont home  5. On Lasix drip 10 mg/hr.  -CVP 9 via PICC this am. SVO2 62%  -GDMT: Cont ACE, Aldactone. No BB due to ADHF/ on   -ICD in place  -Per records from last hospitalization- pt was not worked up for advanced options due to financial issues, schizophrenia, and chronic pain issues. Discussed this wit pt and girlfriend this am    CAD s/p PCI  - Continue statin, DAPT      Hx of Depression / Anxiety / Schizophrenia  -Continue home mirtazapine and klonopin  -On Invega injections as an outpt    Active Hospital Problems    Diagnosis  POA    *Acute on chronic combined systolic and diastolic CHF, NYHA class 4 [I50.43]  Yes    Essential hypertension [I10]  Yes    Coronary artery disease involving native coronary artery [I25.10]  Yes    Heart failure [I50.9]  Yes    Paranoid schizophrenia [F20.0]  Yes    Cardiogenic shock [R57.0]  Yes      Resolved Hospital Problems    Diagnosis Date Resolved POA   No resolved problems to display.

## 2017-03-14 NOTE — PLAN OF CARE
Problem: Patient Care Overview  Goal: Individualization & Mutuality  Outcome: Ongoing (interventions implemented as appropriate)  No acute events throughout night, remains on Dobutamine and Lasix infusions. SVO2 62. VS and assessment per flow sheet, patient progressing towards goals as tolerated, plan of care reviewed with Urbano Hensley and family, all concerns addressed, will continue to monitor.

## 2017-03-15 LAB
ALBUMIN SERPL BCP-MCNC: 2.9 G/DL
ALLENS TEST: ABNORMAL
ALP SERPL-CCNC: 202 U/L
ALT SERPL W/O P-5'-P-CCNC: 25 U/L
ANION GAP SERPL CALC-SCNC: 10 MMOL/L
ANION GAP SERPL CALC-SCNC: 10 MMOL/L
AST SERPL-CCNC: 22 U/L
BASOPHILS # BLD AUTO: 0.04 K/UL
BASOPHILS NFR BLD: 0.5 %
BILIRUB SERPL-MCNC: 1.4 MG/DL
BUN SERPL-MCNC: 19 MG/DL
BUN SERPL-MCNC: 21 MG/DL
CALCIUM SERPL-MCNC: 8.9 MG/DL
CALCIUM SERPL-MCNC: 8.9 MG/DL
CHLORIDE SERPL-SCNC: 99 MMOL/L
CHLORIDE SERPL-SCNC: 99 MMOL/L
CO2 SERPL-SCNC: 28 MMOL/L
CO2 SERPL-SCNC: 28 MMOL/L
CREAT SERPL-MCNC: 1.2 MG/DL
CREAT SERPL-MCNC: 1.2 MG/DL
DELSYS: ABNORMAL
DIFFERENTIAL METHOD: ABNORMAL
EOSINOPHIL # BLD AUTO: 0.5 K/UL
EOSINOPHIL NFR BLD: 6.4 %
ERYTHROCYTE [DISTWIDTH] IN BLOOD BY AUTOMATED COUNT: 16.6 %
EST. GFR  (AFRICAN AMERICAN): >60 ML/MIN/1.73 M^2
EST. GFR  (AFRICAN AMERICAN): >60 ML/MIN/1.73 M^2
EST. GFR  (NON AFRICAN AMERICAN): >60 ML/MIN/1.73 M^2
EST. GFR  (NON AFRICAN AMERICAN): >60 ML/MIN/1.73 M^2
FIO2: 21
GLUCOSE SERPL-MCNC: 100 MG/DL
GLUCOSE SERPL-MCNC: 93 MG/DL
HCO3 UR-SCNC: 28.1 MMOL/L (ref 24–28)
HCT VFR BLD AUTO: 32.2 %
HGB BLD-MCNC: 10.2 G/DL
LYMPHOCYTES # BLD AUTO: 2.2 K/UL
LYMPHOCYTES NFR BLD: 28.1 %
MAGNESIUM SERPL-MCNC: 2 MG/DL
MAGNESIUM SERPL-MCNC: 2.1 MG/DL
MCH RBC QN AUTO: 26 PG
MCHC RBC AUTO-ENTMCNC: 31.7 %
MCV RBC AUTO: 82 FL
MODE: ABNORMAL
MONOCYTES # BLD AUTO: 0.8 K/UL
MONOCYTES NFR BLD: 9.4 %
NEUTROPHILS # BLD AUTO: 4.4 K/UL
NEUTROPHILS NFR BLD: 55.5 %
PCO2 BLDA: 43.5 MMHG (ref 35–45)
PH SMN: 7.42 [PH] (ref 7.35–7.45)
PLATELET # BLD AUTO: 217 K/UL
PMV BLD AUTO: 9.9 FL
PO2 BLDA: 26 MMHG (ref 40–60)
POC BE: 4 MMOL/L
POC SATURATED O2: 48 % (ref 95–100)
POC TCO2: 29 MMOL/L (ref 24–29)
POTASSIUM SERPL-SCNC: 3.6 MMOL/L
POTASSIUM SERPL-SCNC: 4 MMOL/L
PROT SERPL-MCNC: 6.5 G/DL
RBC # BLD AUTO: 3.93 M/UL
SAMPLE: ABNORMAL
SITE: ABNORMAL
SODIUM SERPL-SCNC: 137 MMOL/L
SODIUM SERPL-SCNC: 137 MMOL/L
SP02: 98
WBC # BLD AUTO: 7.96 K/UL

## 2017-03-15 PROCEDURE — 20600001 HC STEP DOWN PRIVATE ROOM

## 2017-03-15 PROCEDURE — 99232 SBSQ HOSP IP/OBS MODERATE 35: CPT | Mod: ,,, | Performed by: INTERNAL MEDICINE

## 2017-03-15 PROCEDURE — 63600175 PHARM REV CODE 636 W HCPCS: Performed by: PHYSICIAN ASSISTANT

## 2017-03-15 PROCEDURE — 85025 COMPLETE CBC W/AUTO DIFF WBC: CPT

## 2017-03-15 PROCEDURE — 80053 COMPREHEN METABOLIC PANEL: CPT

## 2017-03-15 PROCEDURE — 25000003 PHARM REV CODE 250: Performed by: INTERNAL MEDICINE

## 2017-03-15 PROCEDURE — 82803 BLOOD GASES ANY COMBINATION: CPT

## 2017-03-15 PROCEDURE — 99900035 HC TECH TIME PER 15 MIN (STAT)

## 2017-03-15 PROCEDURE — 25000003 PHARM REV CODE 250: Performed by: PHYSICIAN ASSISTANT

## 2017-03-15 PROCEDURE — 80048 BASIC METABOLIC PNL TOTAL CA: CPT

## 2017-03-15 PROCEDURE — 83735 ASSAY OF MAGNESIUM: CPT | Mod: 91

## 2017-03-15 PROCEDURE — 83880 ASSAY OF NATRIURETIC PEPTIDE: CPT

## 2017-03-15 RX ORDER — BUMETANIDE 1 MG/1
2 TABLET ORAL 2 TIMES DAILY
Status: DISCONTINUED | OUTPATIENT
Start: 2017-03-15 | End: 2017-03-17 | Stop reason: HOSPADM

## 2017-03-15 RX ORDER — NICOTINE 7MG/24HR
1 PATCH, TRANSDERMAL 24 HOURS TRANSDERMAL DAILY
Status: DISCONTINUED | OUTPATIENT
Start: 2017-03-15 | End: 2017-03-17 | Stop reason: HOSPADM

## 2017-03-15 RX ORDER — POTASSIUM CHLORIDE 750 MG/1
60 CAPSULE, EXTENDED RELEASE ORAL ONCE
Status: COMPLETED | OUTPATIENT
Start: 2017-03-15 | End: 2017-03-15

## 2017-03-15 RX ORDER — FUROSEMIDE 10 MG/ML
80 INJECTION INTRAMUSCULAR; INTRAVENOUS ONCE
Status: COMPLETED | OUTPATIENT
Start: 2017-03-15 | End: 2017-03-15

## 2017-03-15 RX ORDER — POTASSIUM CHLORIDE 750 MG/1
40 CAPSULE, EXTENDED RELEASE ORAL 2 TIMES DAILY
Status: DISCONTINUED | OUTPATIENT
Start: 2017-03-15 | End: 2017-03-17 | Stop reason: HOSPADM

## 2017-03-15 RX ADMIN — SPIRONOLACTONE 25 MG: 25 TABLET, FILM COATED ORAL at 08:03

## 2017-03-15 RX ADMIN — NICOTINE 1 PATCH: 7 PATCH, EXTENDED RELEASE TRANSDERMAL at 12:03

## 2017-03-15 RX ADMIN — FUROSEMIDE 80 MG: 10 INJECTION, SOLUTION INTRAMUSCULAR; INTRAVENOUS at 08:03

## 2017-03-15 RX ADMIN — OXYCODONE HYDROCHLORIDE 5 MG: 5 TABLET ORAL at 08:03

## 2017-03-15 RX ADMIN — CLONAZEPAM 0.5 MG: 0.5 TABLET ORAL at 08:03

## 2017-03-15 RX ADMIN — CLONAZEPAM 0.5 MG: 0.5 TABLET ORAL at 09:03

## 2017-03-15 RX ADMIN — POTASSIUM CHLORIDE 60 MEQ: 750 CAPSULE, EXTENDED RELEASE ORAL at 08:03

## 2017-03-15 RX ADMIN — OXYCODONE HYDROCHLORIDE 5 MG: 5 TABLET ORAL at 03:03

## 2017-03-15 RX ADMIN — LISINOPRIL 2.5 MG: 2.5 TABLET ORAL at 09:03

## 2017-03-15 RX ADMIN — LISINOPRIL 2.5 MG: 2.5 TABLET ORAL at 08:03

## 2017-03-15 RX ADMIN — OXYCODONE HYDROCHLORIDE 5 MG: 5 TABLET ORAL at 09:03

## 2017-03-15 RX ADMIN — BUMETANIDE 2 MG: 1 TABLET ORAL at 05:03

## 2017-03-15 RX ADMIN — SODIUM CHLORIDE, PRESERVATIVE FREE 10 ML: 5 INJECTION INTRAVENOUS at 12:03

## 2017-03-15 RX ADMIN — GABAPENTIN 800 MG: 100 CAPSULE ORAL at 08:03

## 2017-03-15 RX ADMIN — OXYCODONE HYDROCHLORIDE 5 MG: 5 TABLET ORAL at 04:03

## 2017-03-15 RX ADMIN — ASPIRIN 81 MG: 81 TABLET, COATED ORAL at 08:03

## 2017-03-15 RX ADMIN — POTASSIUM CHLORIDE 40 MEQ: 750 CAPSULE, EXTENDED RELEASE ORAL at 08:03

## 2017-03-15 RX ADMIN — SODIUM CHLORIDE, PRESERVATIVE FREE 10 ML: 5 INJECTION INTRAVENOUS at 05:03

## 2017-03-15 RX ADMIN — CLOPIDOGREL 75 MG: 75 TABLET, FILM COATED ORAL at 08:03

## 2017-03-15 NOTE — PLAN OF CARE
[facility]   Heart Transplant Clinic  [address] [place] [address]   [phone] after hours        HOME  HEALTH ORDERS      Admit to Home Health    Diagnosis:   Patient Active Problem List   Diagnosis    Acromioclavicular joint separation, type 3    Chest pain    Cardiogenic shock    Coronary artery disease involving native coronary artery of native heart without angina pectoris    Ischemic cardiomyopathy    Chronic systolic heart failure HE IS NOT SUITABLE CANDIDATE FOR ADVANCED OPTIONS    Paranoid schizophrenia    COCM (congestive cardiomyopathy)    Heart failure    Hypervolemia    Acute on chronic combined systolic and diastolic CHF, NYHA class 4    Essential hypertension    Coronary artery disease involving native coronary artery       Patient is homebound due to:  Chronic Systolic HF, on home     Diet: 2 gm Na, 1.5L fluid restriction    Acitivities: As tolerated    Nursing:   SN to complete comprehensive assessment including routine vital signs. Instruct on disease process and s/s of complications to report to MD. Review/verify medication list sent home with the patient at time of discharge  and instruct patient/caregiver as needed. Frequency may be adjusted depending on start of care date.    Notify MD if SBP > 160 or < 90; DBP > 90 or < 50; HR > 120 or < 50; Temp > 101; Weight gain >3lbs in 1 day or 5lbs in 1 week.      LABS:  SN to perform labs: BMP, BNP, CBC, Mg q week to local cardiologist    HOME INFUSION THERAPY:    SN to perform Infusion Therapy/Central Line Care.  Review Central Line Care & Central Line Flush with patient.    Administer (drug and dose):  5 mcg/kg/min, dosing weight 66 kg      **For questions or concerns, please call [phone] and ask for Pre-Heart transplant clinic, M-F 8-5. After hours, weekends, call [phone] and ask for the Heart Transplant Cardiologist on call.**    Central line care:  Scrub the  Hub: Prior to accessing the line, always perform a 30 second alcohol scrub  Each lumen of the central line is to be flushed at least daily with 10 mL Normal Saline and 3 mL Heparin flush (100 units/mL)  Skilled Nurse (SN) may draw blood from IV access  Blood Draw Procedure:   - Aspirate at least 5 mL of blood   - Discard   - Obtain specimen   - Change posiflow cap   - Flush with 20 mL Normal Saline followed by a                 3-5 mL Heparin flush (100 units/mL)  Central :   - Sterile dressing changes are done weekly and as needed.   - Use chlor-hexadine scrub to cleanse site, apply Biopatch to insertion site,       apply securement device dressing   - Posi-flow caps are changed weekly and after EVERY lab draw.   - If sterile gauze is under dressing to control oozing,                 dressing change must be performed every 24 hours until gauze is not needed.            Send initial Home Health orders to HTS attending physician on call.  Send follow up questions to [phone] or fax:                          Heart Failure:      [phone]    Please transfer Home Health care to patient's local cardiologist, Dr. Dillard and Dr. Hurst.

## 2017-03-15 NOTE — PROGRESS NOTES
Notified MD (Dr. King) of pt complaint. Pt complaining of pain in the abdomen (10 out of 10 on pain scale). Pt received Oxycodone during day shift @1600, medication scheduled Q6hrs and not time for next dose. Md acknowledged. Orders given: give one time dose of 5mg now, and continue prn medication as follows.

## 2017-03-15 NOTE — PROGRESS NOTES
03/15/17 0853   Vital Signs   Temp 98.1 °F (36.7 °C)   Temp src Oral   Pulse 100   Heart Rate Source Monitor   Resp 20   SpO2 98 %   Pulse Oximetry Type Intermittent   Flow (L/min) 0   O2 Device (Oxygen Therapy) room air   BP 90/67   MAP (mmHg) 75   BP Location Right arm   BP Method Automatic   Patient Position Lying      ADRIAN Santiago notified of BP. Okay to give AM lisinopril. Will continue to monitor.

## 2017-03-15 NOTE — PROGRESS NOTES
Last documented weight was 3/12/17 (76.1kg), pt is a transfer from ICU on yesterday 3/14/17. Today's weight is 65.9kg. 22lb weigh difference.

## 2017-03-15 NOTE — PLAN OF CARE
Problem: Patient Care Overview  Goal: Plan of Care Review  Outcome: Ongoing (interventions implemented as appropriate)  Plan of care discussed with patient, no new questions at this time. VSS, denies SOB, pain being managed. Lasix/dobutamine gtt infusing. Pt diuressing well. I's and O's being measured. Safety precautions taken, no falls/trauma during the shift. Will continue to monitor.

## 2017-03-15 NOTE — PROGRESS NOTES
"Hospital Day: 4    Subjective:  Interval History: Pt is feeling better but still c/o episodes of PND    Scheduled Meds:   aspirin  81 mg Oral Daily    clonazePAM  0.5 mg Oral BID    clopidogrel  75 mg Oral Daily    furosemide  80 mg Intravenous Once    gabapentin  800 mg Oral BID    lisinopril  2.5 mg Oral BID    mirtazapine  45 mg Oral QHS    potassium chloride  40 mEq Oral BID    potassium chloride  60 mEq Oral Once    sodium chloride 0.9%  10 mL Intravenous Q6H    spironolactone  25 mg Oral Daily     Continuous Infusions:   DOBUTamine 5 mcg/kg/min (03/14/17 1400)    furosemide (LASIX) 1 mg/mL infusion (non-titrating) 10 mg/hr (03/14/17 2146)     PRN Meds:acetaminophen, ondansetron, oxycodone, Flushing PICC Protocol **AND** sodium chloride 0.9% **AND** sodium chloride 0.9%    Objective:  Vital signs in last 24 hours:   Temp:  [98.1 °F (36.7 °C)-98.3 °F (36.8 °C)] 98.2 °F (36.8 °C)  Pulse:  [] 110  Resp:  [20-26] 20  SpO2:  [95 %] 95 %  BP: (87-98)/(60-76) 92/65    Intake/Output last 3 shifts:  I/O last 3 completed shifts:  In: 1269 [P.O.:880; I.V.:389]  Out: 2600 [Urine:2600]  Intake/Output this shift:       Physical Exam:  BP 92/65 (BP Location: Right arm, Patient Position: Lying, BP Method: Automatic)  Pulse 110  Temp 98.2 °F (36.8 °C) (Oral)   Resp 20  Ht 5' 11" (1.803 m)  Wt 65.9 kg (145 lb 4.5 oz)  SpO2 95%  BMI 20.26 kg/m2  General appearance: mild distress  Neck: JVD right above clavical, supple, symmetrical, trachea midline  Lungs: clear to auscultation bilaterally  Heart: regular rate and rhythm, S1, S2 normal, no murmur, click, rub or gallop  Abdomen: soft, NT, mild distension  Extremities: extremities normal, atraumatic, no cyanosis or edema, warm on exam  Neurologic: Grossly normal      Lab Review  Lab Results   Component Value Date     03/15/2017    K 3.6 03/15/2017    CL 99 03/15/2017    CO2 28 03/15/2017    BUN 21 (H) 03/15/2017    CREATININE 1.2 03/15/2017    CALCIUM " 8.9 03/15/2017     Lab Results   Component Value Date    WBC 7.96 03/15/2017    HGB 10.2 (L) 03/15/2017    HCT 32.2 (L) 03/15/2017    MCV 82 03/15/2017     03/15/2017      2DE 3/13/17:  CONCLUSIONS     1 - Severe left ventricular enlargement.     2 - Severely depressed left ventricular systolic function (EF 15-20%).     3 - Left ventricular diastolic dysfunction.     4 - Moderately depressed right ventricular systolic function .     5 - Severe left atrial enlargement.     6 - Pulmonary hypertension. The estimated PA systolic pressure is 46 mmHg.     7 - Trivial to mild aortic regurgitation.     8 - Moderate mitral regurgitation.     9 - Moderate tricuspid regurgitation.     10 - Increased central venous pressure.     Assessment/Plan:  46 y.o. gentleman with PMH of ICM (EF=10%), CAD (s/p PCI), HLD, HTN, depression and schizophrenia that presents from OSH for management of ADHF.    Acute on Chronic Systolic HF  -Cont home  5. On Lasix drip 10 mg/hr. Net neg 1.1 L over 24 hours and 3L since admission. Will transition to PO lasix this evening- change home regimen to Bumex 2 BID and monitor pt to assure he has good UOP with this  -GDMT: Cont ACE, Aldactone. No BB due to ADHF/ on   -ICD in place  -Per records from last hospitalization- pt was not worked up for advanced options due to financial issues, schizophrenia, and chronic pain issues. Discussed this with pt and girlfriend. Pt will f/u with his local cardiologist on d/c    CAD s/p PCI  - Continue statin, DAPT      Hx of Depression / Anxiety / Schizophrenia  -Continue home klonopin  -On Invega injections as an outpt    Active Hospital Problems    Diagnosis  POA    *Acute on chronic combined systolic and diastolic CHF, NYHA class 4 [I50.43]  Yes    Essential hypertension [I10]  Yes    Coronary artery disease involving native coronary artery [I25.10]  Yes    Heart failure [I50.9]  Yes    Paranoid schizophrenia [F20.0]  Yes    Cardiogenic shock [R57.0]   Yes      Resolved Hospital Problems    Diagnosis Date Resolved POA   No resolved problems to display.

## 2017-03-15 NOTE — PLAN OF CARE
Problem: Patient Care Overview  Goal: Plan of Care Review  Outcome: Ongoing (interventions implemented as appropriate)  Pt resting in room throughout shift, very sleepy. Pt complaining of pain throughout shift, prn meds given. No SOB or chest pain. SR-ST on Telemetry. VSS. Blanchable redness noted to sacral area this shift. Call light in reach. Fall precautions in place, pt did not have any falls or injuries this shift. Nicotine patch placed to left arm. Lasix drip d/c this shift. Plan of care discussed with patient no questions at this time. Will continue to monitor.

## 2017-03-15 NOTE — PROGRESS NOTES
Blanchable redness noted to pt's sacrum and surrounding area. Thick barrier cream and sacrum foam dressing applied. Wedge given to pt to help keep him turned. Pt verbalized relief. Will continue to monitor.

## 2017-03-15 NOTE — PROGRESS NOTES
Pt had 13 beat run of vtach. Pt asymptomatic. Notified ADRIAN Santiago. Ordered to d/c lasix drip. Will continue to monitor.

## 2017-03-16 LAB
ALBUMIN SERPL BCP-MCNC: 2.9 G/DL
ALLENS TEST: ABNORMAL
ALP SERPL-CCNC: 211 U/L
ALT SERPL W/O P-5'-P-CCNC: 31 U/L
ANION GAP SERPL CALC-SCNC: 11 MMOL/L
AST SERPL-CCNC: 30 U/L
BASOPHILS # BLD AUTO: 0.03 K/UL
BASOPHILS NFR BLD: 0.4 %
BILIRUB SERPL-MCNC: 1.3 MG/DL
BNP SERPL-MCNC: 1199 PG/ML
BUN SERPL-MCNC: 23 MG/DL
CALCIUM SERPL-MCNC: 9 MG/DL
CHLORIDE SERPL-SCNC: 100 MMOL/L
CO2 SERPL-SCNC: 25 MMOL/L
CREAT SERPL-MCNC: 1.2 MG/DL
DIFFERENTIAL METHOD: ABNORMAL
EOSINOPHIL # BLD AUTO: 0.5 K/UL
EOSINOPHIL NFR BLD: 6.4 %
ERYTHROCYTE [DISTWIDTH] IN BLOOD BY AUTOMATED COUNT: 17.1 %
EST. GFR  (AFRICAN AMERICAN): >60 ML/MIN/1.73 M^2
EST. GFR  (NON AFRICAN AMERICAN): >60 ML/MIN/1.73 M^2
GLUCOSE SERPL-MCNC: 95 MG/DL
HCO3 UR-SCNC: 26 MMOL/L (ref 24–28)
HCT VFR BLD AUTO: 31.2 %
HGB BLD-MCNC: 9.9 G/DL
LYMPHOCYTES # BLD AUTO: 2 K/UL
LYMPHOCYTES NFR BLD: 26.8 %
MAGNESIUM SERPL-MCNC: 2.1 MG/DL
MCH RBC QN AUTO: 26.2 PG
MCHC RBC AUTO-ENTMCNC: 31.7 %
MCV RBC AUTO: 83 FL
MONOCYTES # BLD AUTO: 0.6 K/UL
MONOCYTES NFR BLD: 8.3 %
NEUTROPHILS # BLD AUTO: 4.4 K/UL
NEUTROPHILS NFR BLD: 58 %
PCO2 BLDA: 41 MMHG (ref 35–45)
PH SMN: 7.41 [PH] (ref 7.35–7.45)
PLATELET # BLD AUTO: 187 K/UL
PMV BLD AUTO: 9.6 FL
PO2 BLDA: 28 MMHG (ref 40–60)
POC BE: 1 MMOL/L
POC SATURATED O2: 52 % (ref 95–100)
POC TCO2: 27 MMOL/L (ref 24–29)
POTASSIUM SERPL-SCNC: 4.4 MMOL/L
PROT SERPL-MCNC: 6.3 G/DL
RBC # BLD AUTO: 3.78 M/UL
SAMPLE: ABNORMAL
SITE: ABNORMAL
SODIUM SERPL-SCNC: 136 MMOL/L
WBC # BLD AUTO: 7.61 K/UL

## 2017-03-16 PROCEDURE — 25000003 PHARM REV CODE 250: Performed by: PHYSICIAN ASSISTANT

## 2017-03-16 PROCEDURE — 25000003 PHARM REV CODE 250: Performed by: INTERNAL MEDICINE

## 2017-03-16 PROCEDURE — 20600001 HC STEP DOWN PRIVATE ROOM

## 2017-03-16 PROCEDURE — 85025 COMPLETE CBC W/AUTO DIFF WBC: CPT

## 2017-03-16 PROCEDURE — 82803 BLOOD GASES ANY COMBINATION: CPT

## 2017-03-16 PROCEDURE — 99232 SBSQ HOSP IP/OBS MODERATE 35: CPT | Mod: ,,, | Performed by: INTERNAL MEDICINE

## 2017-03-16 PROCEDURE — 63600175 PHARM REV CODE 636 W HCPCS: Performed by: INTERNAL MEDICINE

## 2017-03-16 PROCEDURE — 83735 ASSAY OF MAGNESIUM: CPT

## 2017-03-16 PROCEDURE — 99900035 HC TECH TIME PER 15 MIN (STAT)

## 2017-03-16 PROCEDURE — 80053 COMPREHEN METABOLIC PANEL: CPT

## 2017-03-16 RX ORDER — POLYETHYLENE GLYCOL 3350 17 G/17G
17 POWDER, FOR SOLUTION ORAL 2 TIMES DAILY PRN
Status: DISCONTINUED | OUTPATIENT
Start: 2017-03-16 | End: 2017-03-17 | Stop reason: HOSPADM

## 2017-03-16 RX ADMIN — GABAPENTIN 800 MG: 100 CAPSULE ORAL at 08:03

## 2017-03-16 RX ADMIN — OXYCODONE HYDROCHLORIDE 5 MG: 5 TABLET ORAL at 07:03

## 2017-03-16 RX ADMIN — ASPIRIN 81 MG: 81 TABLET, COATED ORAL at 08:03

## 2017-03-16 RX ADMIN — CLOPIDOGREL 75 MG: 75 TABLET, FILM COATED ORAL at 08:03

## 2017-03-16 RX ADMIN — SPIRONOLACTONE 25 MG: 25 TABLET, FILM COATED ORAL at 08:03

## 2017-03-16 RX ADMIN — SODIUM CHLORIDE, PRESERVATIVE FREE 10 ML: 5 INJECTION INTRAVENOUS at 05:03

## 2017-03-16 RX ADMIN — BUMETANIDE 2 MG: 1 TABLET ORAL at 08:03

## 2017-03-16 RX ADMIN — OXYCODONE HYDROCHLORIDE 5 MG: 5 TABLET ORAL at 12:03

## 2017-03-16 RX ADMIN — LISINOPRIL 2.5 MG: 2.5 TABLET ORAL at 08:03

## 2017-03-16 RX ADMIN — OXYCODONE HYDROCHLORIDE 5 MG: 5 TABLET ORAL at 06:03

## 2017-03-16 RX ADMIN — SODIUM CHLORIDE, PRESERVATIVE FREE 10 ML: 5 INJECTION INTRAVENOUS at 04:03

## 2017-03-16 RX ADMIN — POTASSIUM CHLORIDE 40 MEQ: 750 CAPSULE, EXTENDED RELEASE ORAL at 08:03

## 2017-03-16 RX ADMIN — NICOTINE 1 PATCH: 7 PATCH, EXTENDED RELEASE TRANSDERMAL at 08:03

## 2017-03-16 RX ADMIN — DOBUTAMINE IN DEXTROSE 5 MCG/KG/MIN: 400 INJECTION, SOLUTION INTRAVENOUS at 03:03

## 2017-03-16 RX ADMIN — POLYETHYLENE GLYCOL 3350 17 G: 17 POWDER, FOR SOLUTION ORAL at 05:03

## 2017-03-16 RX ADMIN — SODIUM CHLORIDE, PRESERVATIVE FREE 10 ML: 5 INJECTION INTRAVENOUS at 01:03

## 2017-03-16 RX ADMIN — CLONAZEPAM 0.5 MG: 0.5 TABLET ORAL at 08:03

## 2017-03-16 NOTE — PLAN OF CARE
Problem: Patient Care Overview  Goal: Plan of Care Review  Outcome: Ongoing (interventions implemented as appropriate)  Plan of care reviewed with pt. VS stable per pt baseline, SBP 80s.  gtt maintained. No acute events at this time. No complaints. Pt remains free from falls or injury. Bed low and locked, call light and personal belongings within reach. Will continue to monitor. Vanessa Shelton RN

## 2017-03-16 NOTE — PROGRESS NOTES
"Hospital Day: 5    Subjective:  Interval History: Pt feeling a little better    Scheduled Meds:   aspirin  81 mg Oral Daily    bumetanide  2 mg Oral BID    clonazePAM  0.5 mg Oral BID    clopidogrel  75 mg Oral Daily    gabapentin  800 mg Oral BID    lisinopril  2.5 mg Oral BID    nicotine  1 patch Transdermal Daily    potassium chloride  40 mEq Oral BID    sodium chloride 0.9%  10 mL Intravenous Q6H    spironolactone  25 mg Oral Daily     Continuous Infusions:   DOBUTamine 5 mcg/kg/min (03/16/17 0355)     PRN Meds:acetaminophen, ondansetron, oxycodone, Flushing PICC Protocol **AND** sodium chloride 0.9% **AND** sodium chloride 0.9%    Objective:  Vital signs in last 24 hours:   Temp:  [98 °F (36.7 °C)-98.2 °F (36.8 °C)] 98.1 °F (36.7 °C)  Pulse:  [] 91  Resp:  [20-22] 20  SpO2:  [97 %-99 %] 98 %  BP: (86-92)/(58-69) 89/58    Intake/Output last 3 shifts:  I/O last 3 completed shifts:  In: 1300 [P.O.:1300]  Out: 3100 [Urine:3100]  Intake/Output this shift:  I/O this shift:  In: -   Out: 1000 [Urine:1000]    Physical Exam:  BP (!) 89/58 (BP Location: Right arm, Patient Position: Lying, BP Method: Automatic)  Pulse 91  Temp 98.1 °F (36.7 °C) (Oral)   Resp 20  Ht 5' 11" (1.803 m)  Wt 66.7 kg (147 lb 0.8 oz)  SpO2 98%  BMI 20.51 kg/m2  General appearance: mild distress  Neck: No JVD, supple, symmetrical, trachea midline  Lungs: clear to auscultation bilaterally  Heart: regular rate and rhythm, S1, S2 normal, no murmur, click, rub or gallop  Abdomen: soft, NT, mild distension  Extremities: extremities normal, atraumatic, no cyanosis or edema, warm on exam  Neurologic: Grossly normal      Lab Review  Lab Results   Component Value Date     03/16/2017    K 4.4 03/16/2017     03/16/2017    CO2 25 03/16/2017    BUN 23 (H) 03/16/2017    CREATININE 1.2 03/16/2017    CALCIUM 9.0 03/16/2017     Lab Results   Component Value Date    WBC 7.61 03/16/2017    HGB 9.9 (L) 03/16/2017    HCT 31.2 (L) " 03/16/2017    MCV 83 03/16/2017     03/16/2017      2DE 3/13/17:  CONCLUSIONS     1 - Severe left ventricular enlargement.     2 - Severely depressed left ventricular systolic function (EF 15-20%).     3 - Left ventricular diastolic dysfunction.     4 - Moderately depressed right ventricular systolic function .     5 - Severe left atrial enlargement.     6 - Pulmonary hypertension. The estimated PA systolic pressure is 46 mmHg.     7 - Trivial to mild aortic regurgitation.     8 - Moderate mitral regurgitation.     9 - Moderate tricuspid regurgitation.     10 - Increased central venous pressure.     Assessment/Plan:  46 y.o. gentleman with PMH of ICM (EF=10%), CAD (s/p PCI), HLD, HTN, depression and schizophrenia that presents from OSH for management of ADHF.    Acute on Chronic Systolic HF  -Cont home  5. Transitioned to PO Bumex yesterday afternoon with good UOP  -GDMT: Cont ACE, Aldactone. No BB due to ADHF/ on   -ICD in place  -Per records from last hospitalization- pt was not worked up for advanced options due to financial issues, schizophrenia, and chronic pain issues. Discussed this with pt and girlfriend. Pt will f/u with his local cardiologist on d/c    CAD s/p PCI  - Continue statin, DAPT      Hx of Depression / Anxiety / Schizophrenia  -Continue home klonopin  -On Invega injections as an outpt    Active Hospital Problems    Diagnosis  POA    *Acute on chronic combined systolic and diastolic CHF, NYHA class 4 [I50.43]  Yes    Essential hypertension [I10]  Yes    Coronary artery disease involving native coronary artery [I25.10]  Yes    Heart failure [I50.9]  Yes    Paranoid schizophrenia [F20.0]  Yes    Cardiogenic shock [R57.0]  Yes      Resolved Hospital Problems    Diagnosis Date Resolved POA   No resolved problems to display.

## 2017-03-16 NOTE — PROGRESS NOTES
Notified Pamela Rolling of pt BP of 85/57. Pt is asymptomatic. No further orders were given. Will continue to monitor.

## 2017-03-16 NOTE — PLAN OF CARE
Problem: Patient Care Overview  Goal: Plan of Care Review  Outcome: Ongoing (interventions implemented as appropriate)  Plan of care discussed with patient. Patient is free of fall/trauma/injury. Denies CP, SOB, or pain/discomfort. All questions addressed. Will continue to monitor

## 2017-03-17 VITALS
SYSTOLIC BLOOD PRESSURE: 83 MMHG | WEIGHT: 145.75 LBS | DIASTOLIC BLOOD PRESSURE: 57 MMHG | TEMPERATURE: 98 F | OXYGEN SATURATION: 98 % | HEART RATE: 94 BPM | BODY MASS INDEX: 20.4 KG/M2 | RESPIRATION RATE: 18 BRPM | HEIGHT: 71 IN

## 2017-03-17 LAB
ALBUMIN SERPL BCP-MCNC: 2.9 G/DL
ALLENS TEST: ABNORMAL
ALP SERPL-CCNC: 214 U/L
ALT SERPL W/O P-5'-P-CCNC: 32 U/L
ANION GAP SERPL CALC-SCNC: 8 MMOL/L
AST SERPL-CCNC: 28 U/L
BASOPHILS # BLD AUTO: 0.06 K/UL
BASOPHILS NFR BLD: 0.8 %
BILIRUB SERPL-MCNC: 1.3 MG/DL
BUN SERPL-MCNC: 25 MG/DL
CALCIUM SERPL-MCNC: 8.8 MG/DL
CHLORIDE SERPL-SCNC: 101 MMOL/L
CO2 SERPL-SCNC: 24 MMOL/L
CREAT SERPL-MCNC: 1.2 MG/DL
DELSYS: ABNORMAL
DIFFERENTIAL METHOD: ABNORMAL
EOSINOPHIL # BLD AUTO: 0.4 K/UL
EOSINOPHIL NFR BLD: 5.4 %
ERYTHROCYTE [DISTWIDTH] IN BLOOD BY AUTOMATED COUNT: 16.9 %
EST. GFR  (AFRICAN AMERICAN): >60 ML/MIN/1.73 M^2
EST. GFR  (NON AFRICAN AMERICAN): >60 ML/MIN/1.73 M^2
FIO2: 21
GLUCOSE SERPL-MCNC: 92 MG/DL
HCO3 UR-SCNC: 24.1 MMOL/L (ref 24–28)
HCT VFR BLD AUTO: 31 %
HGB BLD-MCNC: 10.3 G/DL
LYMPHOCYTES # BLD AUTO: 1.8 K/UL
LYMPHOCYTES NFR BLD: 25.5 %
MAGNESIUM SERPL-MCNC: 2.1 MG/DL
MCH RBC QN AUTO: 26.3 PG
MCHC RBC AUTO-ENTMCNC: 33.2 %
MCV RBC AUTO: 79 FL
MODE: ABNORMAL
MONOCYTES # BLD AUTO: 0.8 K/UL
MONOCYTES NFR BLD: 11.2 %
NEUTROPHILS # BLD AUTO: 4.1 K/UL
NEUTROPHILS NFR BLD: 57 %
PCO2 BLDA: 34.9 MMHG (ref 35–45)
PH SMN: 7.45 [PH] (ref 7.35–7.45)
PLATELET # BLD AUTO: 199 K/UL
PMV BLD AUTO: 9.9 FL
PO2 BLDA: 64 MMHG (ref 40–60)
POC BE: 0 MMOL/L
POC SATURATED O2: 93 % (ref 95–100)
POC TCO2: 25 MMOL/L (ref 24–29)
POTASSIUM SERPL-SCNC: 4.7 MMOL/L
PROT SERPL-MCNC: 6.5 G/DL
RBC # BLD AUTO: 3.91 M/UL
SAMPLE: ABNORMAL
SITE: ABNORMAL
SODIUM SERPL-SCNC: 133 MMOL/L
WBC # BLD AUTO: 7.17 K/UL

## 2017-03-17 PROCEDURE — 85025 COMPLETE CBC W/AUTO DIFF WBC: CPT

## 2017-03-17 PROCEDURE — 99239 HOSP IP/OBS DSCHRG MGMT >30: CPT | Mod: ,,, | Performed by: INTERNAL MEDICINE

## 2017-03-17 PROCEDURE — 82803 BLOOD GASES ANY COMBINATION: CPT

## 2017-03-17 PROCEDURE — 25000003 PHARM REV CODE 250: Performed by: PHYSICIAN ASSISTANT

## 2017-03-17 PROCEDURE — 83735 ASSAY OF MAGNESIUM: CPT

## 2017-03-17 PROCEDURE — 25000003 PHARM REV CODE 250: Performed by: INTERNAL MEDICINE

## 2017-03-17 PROCEDURE — 80053 COMPREHEN METABOLIC PANEL: CPT

## 2017-03-17 RX ORDER — POTASSIUM CHLORIDE 750 MG/1
20 CAPSULE, EXTENDED RELEASE ORAL DAILY
Qty: 30 CAPSULE | Refills: 3 | Status: SHIPPED | OUTPATIENT
Start: 2017-03-17 | End: 2017-03-30 | Stop reason: SDUPTHER

## 2017-03-17 RX ORDER — BUMETANIDE 2 MG/1
2 TABLET ORAL 2 TIMES DAILY
Qty: 60 TABLET | Refills: 3 | Status: SHIPPED | OUTPATIENT
Start: 2017-03-17 | End: 2018-03-17

## 2017-03-17 RX ADMIN — BUMETANIDE 2 MG: 1 TABLET ORAL at 09:03

## 2017-03-17 RX ADMIN — POTASSIUM CHLORIDE 40 MEQ: 750 CAPSULE, EXTENDED RELEASE ORAL at 09:03

## 2017-03-17 RX ADMIN — ASPIRIN 81 MG: 81 TABLET, COATED ORAL at 09:03

## 2017-03-17 RX ADMIN — SPIRONOLACTONE 25 MG: 25 TABLET, FILM COATED ORAL at 09:03

## 2017-03-17 RX ADMIN — NICOTINE 1 PATCH: 7 PATCH, EXTENDED RELEASE TRANSDERMAL at 09:03

## 2017-03-17 RX ADMIN — OXYCODONE HYDROCHLORIDE 5 MG: 5 TABLET ORAL at 06:03

## 2017-03-17 RX ADMIN — LISINOPRIL 2.5 MG: 2.5 TABLET ORAL at 09:03

## 2017-03-17 RX ADMIN — CLOPIDOGREL 75 MG: 75 TABLET, FILM COATED ORAL at 09:03

## 2017-03-17 RX ADMIN — SODIUM CHLORIDE, PRESERVATIVE FREE 10 ML: 5 INJECTION INTRAVENOUS at 06:03

## 2017-03-17 NOTE — PROGRESS NOTES
"Hospital Day: 6    Subjective:  Interval History: Pt feeling a little better    Scheduled Meds:   aspirin  81 mg Oral Daily    bumetanide  2 mg Oral BID    clonazePAM  0.5 mg Oral BID    clopidogrel  75 mg Oral Daily    gabapentin  800 mg Oral BID    lisinopril  2.5 mg Oral BID    nicotine  1 patch Transdermal Daily    potassium chloride  40 mEq Oral BID    sodium chloride 0.9%  10 mL Intravenous Q6H    spironolactone  25 mg Oral Daily     Continuous Infusions:   DOBUTamine 5 mcg/kg/min (03/16/17 0355)     PRN Meds:acetaminophen, ondansetron, oxycodone, polyethylene glycol, Flushing PICC Protocol **AND** sodium chloride 0.9% **AND** sodium chloride 0.9%    Objective:  Vital signs in last 24 hours:   Temp:  [97.2 °F (36.2 °C)-97.8 °F (36.6 °C)] 97.5 °F (36.4 °C)  Pulse:  [] 94  Resp:  [16-20] 18  SpO2:  [93 %-98 %] 98 %  BP: ()/(57-61) 83/57    Intake/Output last 3 shifts:  I/O last 3 completed shifts:  In: 1920 [P.O.:1920]  Out: 4500 [Urine:4500]  Intake/Output this shift:       Physical Exam:  BP (!) 83/57 (BP Location: Right arm, Patient Position: Lying, BP Method: Automatic)  Pulse 94  Temp 97.5 °F (36.4 °C) (Oral)   Resp 18  Ht 5' 11" (1.803 m)  Wt 66.1 kg (145 lb 11.6 oz)  SpO2 98%  BMI 20.32 kg/m2  General appearance: mild distress  Neck: No JVD, supple, symmetrical, trachea midline  Lungs: clear to auscultation bilaterally  Heart: regular rate and rhythm, S1, S2 normal, no murmur, click, rub or gallop  Abdomen: soft, NT, mild distension  Extremities: extremities normal, atraumatic, no cyanosis or edema, warm on exam  Neurologic: Grossly normal      Lab Review  Lab Results   Component Value Date     (L) 03/17/2017    K 4.7 03/17/2017     03/17/2017    CO2 24 03/17/2017    BUN 25 (H) 03/17/2017    CREATININE 1.2 03/17/2017    CALCIUM 8.8 03/17/2017     Lab Results   Component Value Date    WBC 7.17 03/17/2017    HGB 10.3 (L) 03/17/2017    HCT 31.0 (L) 03/17/2017    MCV " 79 (L) 03/17/2017     03/17/2017      2DE 3/13/17:  CONCLUSIONS     1 - Severe left ventricular enlargement.     2 - Severely depressed left ventricular systolic function (EF 15-20%).     3 - Left ventricular diastolic dysfunction.     4 - Moderately depressed right ventricular systolic function .     5 - Severe left atrial enlargement.     6 - Pulmonary hypertension. The estimated PA systolic pressure is 46 mmHg.     7 - Trivial to mild aortic regurgitation.     8 - Moderate mitral regurgitation.     9 - Moderate tricuspid regurgitation.     10 - Increased central venous pressure.     Assessment/Plan:  46 y.o. gentleman with PMH of ICM (EF=10%), CAD (s/p PCI), HLD, HTN, depression and schizophrenia that presents from OSH for management of ADHF.    Acute on Chronic Systolic HF  -Cont home  5. Transitioned to PO Bumex with good UOP  -GDMT: Cont ACE, Aldactone. No BB due to ADHF/ on   -ICD in place  -Per records from last hospitalization- pt was not worked up for advanced options due to financial issues, schizophrenia, and chronic pain issues. Discussed this with pt and girlfriend. Pt will f/u with his local cardiologist on d/c    CAD s/p PCI  - Continue statin, DAPT      Hx of Depression / Anxiety / Schizophrenia  -Continue home klonopin  -On Invega injections as an outpt    Active Hospital Problems    Diagnosis  POA    *Acute on chronic combined systolic and diastolic CHF, NYHA class 4 [I50.43]  Yes    Essential hypertension [I10]  Yes    Coronary artery disease involving native coronary artery [I25.10]  Yes    Heart failure [I50.9]  Yes    Paranoid schizophrenia [F20.0]  Yes    Cardiogenic shock [R57.0]  Yes      Resolved Hospital Problems    Diagnosis Date Resolved POA   No resolved problems to display.

## 2017-03-17 NOTE — DISCHARGE SUMMARY
[facility]    Discharge Summary      Patient Name: Urbano Hensley  MRN: [id number]  Admission Date: 3/12/2017  Hospital Length of Stay: 5 days  Discharge Date and Time:  03/17/2017 2:08 PM  Attending Physician: Hasmukh Sewell   Discharging Provider: ADRIAN Vick  Primary Care Provider: Primary Doctor No     HPI:   46 y.o. gentleman with PMH of ICM (EF=10%), CAD (s/p PCI), HLD, HTN, depression and schizophrenia that presents from OSH for management of acute on chronic HFrEF admitted to [facility] for cardiogenic shock.      Hospital Course:   46 y.o. gentleman with PMH of ICM (EF=10%), CAD (s/p PCI), HLD, HTN, depression and schizophrenia that presents from OSH for management of acute on chronic HFrEF.     Patient was admitted recently on 2/2/17 with cardiogenic shock where he had an IABP placed and improved with IABP,  and diuresis. He was considered for advanced options but was turned down due to his social and psych issues. He was discharged on home  and PO diuretics on on 2/10/17 with plans to follow-up with his local cardiologist, however, since discharge, he reports he has been making minimal urine and has been having worsening abdominal distention and ENGLAND.      He presented to OSH yesterday (3/11) with worsening SOB, ENGLAND, orthopnea, PND and abdominal distention and admitted for diuresis. Patient's labs showed normal renal function and transaminases at the time. He was transferred for advanced options consideration.     He was diuresed and continued on home . He was again turned down for advanced options. Patient was moved to floor once euvolemic. He on day of discharge refused to wait for [facility] to set up his  and left AMA.     Diet low sodium    Provisions none     Consults:   Consults         Status Ordering Provider     Inpatient consult to PICC team (MAYO)  Once     Provider:  (Not yet assigned)    KEVIN Carty              Pending  Diagnostic Studies:     None        Final Active Diagnoses:    Diagnosis Date Noted POA    PRINCIPAL PROBLEM:  Acute on chronic combined systolic and diastolic CHF, NYHA class 4 [I50.43] 03/13/2017 Yes    Essential hypertension [I10] 03/13/2017 Yes    Coronary artery disease involving native coronary artery [I25.10] 03/13/2017 Yes    Heart failure [I50.9] 03/11/2017 Yes    Paranoid schizophrenia [F20.0] 02/04/2017 Yes    Cardiogenic shock [R57.0] 01/31/2017 Yes      Problems Resolved During this Admission:    Diagnosis Date Noted Date Resolved POA      Discharged Condition: good    Disposition: Home or Self Care    Follow Up:   patient will follow locally with cardiologist       Patient Instructions:   No discharge procedures on file.  Medications:  Reconciled Home Medications:   Discharge Medication List as of 3/17/2017 11:04 AM      START taking these medications    Details   bumetanide (BUMEX) 2 MG tablet Take 1 tablet (2 mg total) by mouth 2 (two) times daily., Starting 3/17/2017, Until Sat 3/17/18, Normal      potassium chloride (MICRO-K) 10 MEQ CpSR Take 2 capsules (20 mEq total) by mouth once daily., Starting 3/17/2017, Until Discontinued, Normal         CONTINUE these medications which have NOT CHANGED    Details   aspirin (ECOTRIN) 81 MG EC tablet Take 1 tablet (81 mg total) by mouth once daily., Starting 2/10/2017, Until Sat 2/10/18, OTC      clonazePAM (KLONOPIN) 0.5 MG tablet Take 0.5 mg by mouth 2 (two) times daily., Until Discontinued, Historical Med      clopidogrel (PLAVIX) 75 mg tablet Take 75 mg by mouth once daily., Until Discontinued, Historical Med      DOBUTamine (DOBUTREX) 500 mg/250 mL (2,000 mcg/mL) Inject 359.5 mcg/min into the vein continuous., Starting 2/10/2017, Until Discontinued, No Print      gabapentin (NEURONTIN) 800 MG tablet Take 800 mg by mouth 2 (two) times daily., Until Discontinued, Historical Med      hydrocodone-acetaminophen 5-325mg (NORCO) 5-325 mg per tablet Take 2  tablets by mouth every 6 (six) hours as needed for Pain (breakthrough pain not controlled by other analgesics.  May cause sedation.). Take 1-2 tablets, Starting 1/9/2016, Until Discontinued, Print      lisinopril (PRINIVIL,ZESTRIL) 2.5 MG tablet Take 1 tablet (2.5 mg total) by mouth 2 (two) times daily., Starting 2/10/2017, Until Sat 2/10/18, Normal      mirtazapine (REMERON) 45 MG tablet Take 45 mg by mouth every evening., Until Discontinued, Historical Med      oxycodone (ROXICODONE) 5 MG immediate release tablet Take 1 tablet (5 mg total) by mouth every 6 (six) hours as needed., Starting 2/10/2017, Until Discontinued, Print      pramoxine-hydrocortisone (PROCTOCREAM-HC) 1-1 % rectal cream Place rectally 3 (three) times daily., Starting 4/15/2015, Until Discontinued, Print      spironolactone (ALDACTONE) 25 MG tablet Take 1 tablet (25 mg total) by mouth once daily., Starting 2/10/2017, Until Sat 2/10/18, Normal         STOP taking these medications       furosemide (LASIX) 80 MG tablet Comments:   Reason for Stopping:               ADRIAN Vick  General Surgery  [facility]

## 2017-03-17 NOTE — NURSING
Pt d/c home per MD orders. Telemetry removed, telemetry room notified. ARGENIS PICC remains with continuous  infusing- home dose. VSS. No distress noted. No complaints noted at this time. Pt given and explained medication list and prescriptions. Pt verbalizes complete understanding of all discharge instructions and follow up care. Pt given printed After Visit Summary. Family at bedside. Awaiting [facility] to connect to home . Will continue to monitor.

## 2017-03-17 NOTE — PLAN OF CARE
Problem: Patient Care Overview  Goal: Plan of Care Review  Outcome: Ongoing (interventions implemented as appropriate)  Plan of care reviewed with pt. VS stable per pt baseline, SBP 80-90s.  gtt maintained. No acute events at this time. No complaints. Pt remains free from falls or injury. Bed low and locked, call light and personal belongings within reach. Will continue to monitor. Vanessa Shelton RN

## 2017-03-17 NOTE — PROGRESS NOTES
Pt left hospital with wife at 11:20am without having home  connected. Pt and wife were aware that [facility] was delivering home  to room prior to D/C and connecting pt to infusion; pt and wife informed by myself and SANDRITA Hernandez with [facility].   Pt was to be d/c with prior home  dosing; however, pt did not have a bag of  to go home with. Therefore, [facility] was to deliver and connect pt for 1.5-2 hour ride home.   Pt nor wife informed RN that they were leaving the hospital. Angelika LOU, informed me at 11:20am that she saw pt and wife walking down the [facility] hallway. Peace RN, SRN, called wife to ask if she and pt would come back to the room to wait on home  to be connected; they refused. Wife informed that she would connect pt to his infusion when they get home. Dr. Sewell made aware, in person; he informed to document incident.   Discharge instructions had already been given. Telemetry box had already been removed. ARGENIS PICC was left intact due to need home  infusion.

## 2017-03-17 NOTE — PLAN OF CARE
Problem: Patient Care Overview  Goal: Plan of Care Review  Outcome: Outcome(s) achieved Date Met:  03/17/17  Pt d/c home per MD orders. Telemetry removed, telemetry room notified. ARGENIS PICC remains with continuous  infusing- home dose. VSS. No distress noted. No complaints noted at this time. Pt given and explained medication list and prescriptions. Pt verbalizes complete understanding of all discharge instructions and follow up care. Pt given printed After Visit Summary. Family at bedside. Awaiting [place] to connect to home . Will continue to monitor.

## 2017-03-17 NOTE — PROGRESS NOTES
"DISCHARGE    SW to pt's room for discharge.  Pt presents as sitting up in bed, aao x4, asking and answering questions appropriately.  Pt appears upset and avoiding eye contact.  Pt verbalizes frustration with being on home Dobutamine "for the rest of my life" and verbalizes difficulty coping with current medical condition.  SW providing active listening and emotional support.  Pt verbalizes understanding and agreement with plan to discharge home today, and states he wants to d/c home at noon.  Pt's significant other Gregoria returned to pt's room during SW's visit.  Gregoria reports they will have transportation home at time of d/c.  Pt and sig. other do not identify any needs or concerns at this time.    Pt requesting to use [facility] again; SW faxed d/c and home health orders to [facility] (ph:[phone], f: [phone]).  SW notified Leni with [facility] of d/c today.  Leni reports  should be delivered to pt's room around 12:30pm.  SW providing ongoing psychosocial and counseling support, education, resources, assistance, and discharge planning as indicated.  SW remains available.      UPDATE -- 11:35am: SW notified nurse Karen of estimated [facility] delivery time of 12:30pm.  Per nurse, pt is very anxious to leave at noon.  SW agreed to return to pt's room to discuss waiting for [facility] hook-up at 12:30pm.  Before SW arrived to room, pt left unit without notifying nurse.  Per nurse, pt left IV pole in room and left hospital without .  Nurse called pt to return and pt states he will go home and use his  bags he has at home.  Pt has a 1.5 hour drive home from [facility].  Nurse reports she notified Dr. Sewell that pt left facility without .  SW notified Leni with [facility] that pt has left facility.  [facility] will f/u with pt.  "

## 2017-03-31 RX ORDER — POTASSIUM CHLORIDE 750 MG/1
CAPSULE, EXTENDED RELEASE ORAL
Qty: 180 CAPSULE | Refills: 2 | Status: SHIPPED | OUTPATIENT
Start: 2017-03-31

## 2017-05-04 ENCOUNTER — TELEPHONE (OUTPATIENT)
Dept: TRANSPLANT | Facility: CLINIC | Age: 47
End: 2017-05-04

## 2017-05-04 NOTE — TELEPHONE ENCOUNTER
----- Message from Clary Salas LPN sent at 5/4/2017  8:32 AM CDT -----  Contact: Marycarmen/[facility]      ----- Message -----     From: Aj Singh     Sent: 5/3/2017   4:39 PM       To: [facility] Heart Failure Clinical    Please call Marycarmen at [phone] . Need to confirm the face to face order date (02/09/17). Dr Keita saw patient in-house. Office hours [phone]    Thank you

## 2017-05-04 NOTE — TELEPHONE ENCOUNTER
Returned the call from [facility] and Spoke with Marycarmen. All current questions are answered to her satisfaction as evidenced by verbal acknowledgement.

## 2022-12-23 NOTE — CONSULTS
----- Message from Alejandra Hendrickson sent at 12/23/2022  9:41 AM CST -----  Regarding: Appointment  Contact: Josephine  Double lumen PICC placed to Left basilic vein.  40cm in length, 26cm arm circumference, 0cm exposed.  Lot#[id number].